# Patient Record
Sex: MALE | Race: WHITE | Employment: FULL TIME | URBAN - METROPOLITAN AREA
[De-identification: names, ages, dates, MRNs, and addresses within clinical notes are randomized per-mention and may not be internally consistent; named-entity substitution may affect disease eponyms.]

---

## 2019-12-06 ENCOUNTER — HOSPITAL ENCOUNTER (OUTPATIENT)
Age: 29
Discharge: HOME | End: 2019-12-08
Payer: COMMERCIAL

## 2019-12-06 ENCOUNTER — HOSPITAL ENCOUNTER (OUTPATIENT)
Dept: CT IMAGING | Age: 29
Discharge: HOME | End: 2019-12-08
Payer: COMMERCIAL

## 2019-12-06 ENCOUNTER — HOSPITAL ENCOUNTER (OUTPATIENT)
Dept: ULTRASOUND IMAGING | Age: 29
Discharge: HOME | End: 2019-12-08
Payer: COMMERCIAL

## 2019-12-06 DIAGNOSIS — R10.11 ABDOMINAL PAIN, RIGHT UPPER QUADRANT: ICD-10-CM

## 2019-12-06 PROCEDURE — 2580000003 HC RX 258: Performed by: RADIOLOGY

## 2019-12-06 PROCEDURE — 6360000004 HC RX CONTRAST MEDICATION: Performed by: RADIOLOGY

## 2019-12-06 PROCEDURE — 76700 US EXAM ABDOM COMPLETE: CPT

## 2019-12-06 PROCEDURE — 74177 CT ABD & PELVIS W/CONTRAST: CPT

## 2019-12-06 RX ORDER — SODIUM CHLORIDE 0.9 % (FLUSH) 0.9 %
10 SYRINGE (ML) INJECTION 2 TIMES DAILY
Status: DISCONTINUED | OUTPATIENT
Start: 2019-12-06 | End: 2019-12-09 | Stop reason: HOSPADM

## 2019-12-06 RX ADMIN — Medication 10 ML: at 15:35

## 2019-12-06 RX ADMIN — IOPAMIDOL 100 ML: 755 INJECTION, SOLUTION INTRAVENOUS at 15:35

## 2019-12-06 RX ADMIN — IOHEXOL 50 ML: 240 INJECTION, SOLUTION INTRATHECAL; INTRAVASCULAR; INTRAVENOUS; ORAL at 15:34

## 2020-09-17 ENCOUNTER — OFFICE VISIT (OUTPATIENT)
Dept: PRIMARY CARE CLINIC | Age: 30
End: 2020-09-17
Payer: COMMERCIAL

## 2020-09-17 ENCOUNTER — HOSPITAL ENCOUNTER (OUTPATIENT)
Age: 30
Discharge: HOME | End: 2020-09-19
Payer: COMMERCIAL

## 2020-09-17 VITALS
HEART RATE: 75 BPM | RESPIRATION RATE: 16 BRPM | TEMPERATURE: 98.1 F | HEIGHT: 68 IN | SYSTOLIC BLOOD PRESSURE: 124 MMHG | WEIGHT: 180 LBS | OXYGEN SATURATION: 99 % | DIASTOLIC BLOOD PRESSURE: 78 MMHG | BODY MASS INDEX: 27.28 KG/M2

## 2020-09-17 PROCEDURE — U0003 INFECTIOUS AGENT DETECTION BY NUCLEIC ACID (DNA OR RNA); SEVERE ACUTE RESPIRATORY SYNDROME CORONAVIRUS 2 (SARS-COV-2) (CORONAVIRUS DISEASE [COVID-19]), AMPLIFIED PROBE TECHNIQUE, MAKING USE OF HIGH THROUGHPUT TECHNOLOGIES AS DESCRIBED BY CMS-2020-01-R: HCPCS

## 2020-09-17 PROCEDURE — 99213 OFFICE O/P EST LOW 20 MIN: CPT | Performed by: PHYSICIAN ASSISTANT

## 2020-09-17 RX ORDER — ONDANSETRON 4 MG/1
4 TABLET, FILM COATED ORAL EVERY 8 HOURS PRN
Qty: 15 TABLET | Refills: 0 | Status: SHIPPED
Start: 2020-09-17 | End: 2020-12-13 | Stop reason: ALTCHOICE

## 2020-09-17 RX ORDER — AZITHROMYCIN 250 MG/1
TABLET, FILM COATED ORAL
Qty: 6 TABLET | Refills: 0 | Status: SHIPPED
Start: 2020-09-17 | End: 2020-12-02

## 2020-09-17 NOTE — PROGRESS NOTES
Chief Complaint   Congestion and Cough      History of Present Illness   Source of history provided by: patient. Kimberly Thompson is a 26 y.o. old male who has a past medical history of: History reviewed. No pertinent past medical history. Presents to the flu clinic for evaluation of nasal congestion, body aches, sore throat, and occasionally productive cough with white sputum x 7 days. Pt reports vomiting episodes that have resolved today. Reports ongoing nausea and loss of taste/smell. States symptoms have been persistent since onset. Denies any fever, chills, CP, dyspnea, LE edema, abdominal pain, rash, or lethargy. Denies history of international travel in the past 14 days. Reports contact with individuals with known COVID-19 infection or under investigation for COVID-19 infection as patient works as a nurse. Denies any hx of asthma or tobacco use. ROS   Pertinent positives and negatives are stated within HPI, all other systems reviewed and are negative. Surgical History:  has no past surgical history on file. Social History:  reports that he has never smoked. He has never used smokeless tobacco.  Family History: family history is not on file. Allergies: Patient has no known allergies. Physical Exam      VS:  /78   Pulse 75   Temp 98.1 °F (36.7 °C)   Resp 16   Ht 5' 8\" (1.727 m)   Wt 180 lb (81.6 kg)   SpO2 99%   BMI 27.37 kg/m²    Oxygen Saturation Interpretation: Normal.    Constitutional:  Alert, development consistent with age. NAD. Head:  NC/NT. Airway patent. Mild TTP over the bilateral maxillary sinuses. Ears: TMs dull bilaterally. Canals without exudate or swelling bilaterally. Mouth: Posterior pharynx with mild erythema and clear postnasal drip. No tonsillar hypertrophy or exudate. Neck:  Normal ROM. Supple. No anterior cervical adenopathy noted. Lungs: CTAB without wheezes, rales, or rhonchi.    CV:  Regular rate and rhythm, normal heart sounds, without pathological murmurs, ectopy, gallops, or rubs. GI: BS+x4. Nontender, nondistended without guarding, rebound, or rigidity. Skin:  Normal turgor. Warm, dry, without visible rash. Lymphatic: No lymphangitis or adenopathy noted. Neurological:  Oriented. Motor functions intact. Lab / Imaging Results   (All laboratory and radiology results have been personally reviewed by myself)  Labs:  No results found for this visit on 09/17/20. Imaging: All Radiology results interpreted by Radiologist unless otherwise noted. No results found. Medical Decision Making   Pt non-toxic, in no apparent distress and stable at time of discharge. Assessment/Plan   Kevin Ho was seen today for congestion and cough. Diagnoses and all orders for this visit:    Suspected COVID-19 virus infection  -     COVID-19 Ambulatory; Future  -     azithromycin (ZITHROMAX Z-MARIELY) 250 MG tablet; Take 2 tabs on day one, then 1 tab daily for the next 4 days  -     ondansetron (ZOFRAN) 4 MG tablet; Take 1 tablet by mouth every 8 hours as needed for Nausea or Vomiting        COVID-19 swab obtained and pending, will call with results once available. Advised strict self-quarantine at home in the interim. Pt should remain out of work for at least 10 days from the start of symptoms. Pt should also be fever free for 24 hours and symptoms should be improved overall prior to returning to work. Work excuse provided to patient today. Scripts written for Zithromax and prn Zofran, side effects discussed. Increase fluids and rest. Additional symptomatic relief discussed including Tylenol prn pain/fever. Schedule virtual f/u with PCP in 7-10 days if symptoms persist. ED sooner if symptoms worsen or change. ED immediately with high or refractory fever, progressive SOB, dyspnea, CP, calf pain/swelling, shaking chills, vomiting, abdominal pain, lethargy, flank pain, or decreased urinary output.  Pt verbalizes understanding and is in agreement with plan of care. All questions answered. Matheus Thompson PA-C    This visit was provided as a focused evaluation during the COVID -19 pandemic/national emergency. A comprehensive review of all previous patient history and testing was not conducted. Pertinent findings were elicited during the visit.

## 2020-09-19 LAB
SARS-COV-2: NOT DETECTED
SOURCE: 1

## 2020-12-02 ENCOUNTER — OFFICE VISIT (OUTPATIENT)
Dept: PRIMARY CARE CLINIC | Age: 30
End: 2020-12-02
Payer: COMMERCIAL

## 2020-12-02 VITALS
TEMPERATURE: 100.5 F | SYSTOLIC BLOOD PRESSURE: 122 MMHG | HEART RATE: 80 BPM | RESPIRATION RATE: 18 BRPM | OXYGEN SATURATION: 96 % | WEIGHT: 180 LBS | BODY MASS INDEX: 27.28 KG/M2 | DIASTOLIC BLOOD PRESSURE: 76 MMHG | HEIGHT: 68 IN

## 2020-12-02 LAB
Lab: NORMAL
QC PASS/FAIL: NORMAL
SARS-COV-2, POC: DETECTED

## 2020-12-02 PROCEDURE — 99213 OFFICE O/P EST LOW 20 MIN: CPT | Performed by: NURSE PRACTITIONER

## 2020-12-02 PROCEDURE — 87426 SARSCOV CORONAVIRUS AG IA: CPT | Performed by: NURSE PRACTITIONER

## 2020-12-02 NOTE — PROGRESS NOTES
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 Darshana Dunham is a 31 y.o. old male who presents to the flu clinic with complaints of Fever, Pharyngitis, Nasal Congestion, Anosmia/Ageusia and Fatigue x 1 days. States symptoms have stayed the same since onset. Has been taking none without symptomatic relief. Denies any Shortness of breath. Denies any hx of no history of pneumonia or bronchitis. ROS   Pertinent positives and negatives are stated within HPI, all other systems reviewed and are negative. Past Medical History:  has no past medical history on file. Past Surgical History:  has no past surgical history on file. Social History:  reports that he has never smoked. He has never used smokeless tobacco.  Family History: family history is not on file. Allergies: Patient has no known allergies. Physical Exam   Vital Signs:  /76   Pulse 80   Temp 100.5 °F (38.1 °C) (Oral)   Resp 18   Ht 5' 8\" (1.727 m)   Wt 180 lb (81.6 kg)   SpO2 96%   BMI 27.37 kg/m²    Oxygen Saturation Interpretation: Normal.    Constitutional:  Alert, development consistent with age. NAD. Head:  NC/NT. Airway patent. Ears: TMs Clear bilaterally. Canals without exudate or swelling bilaterally. Mouth: Posterior pharynx with mild erythema and clear postnasal drip. no tonsillar hypertrophy or exudate. Neck:  Normal ROM. Supple. no anterior cervical adenopathy noted. Lungs: CTAB without wheezes, rales, or rhonchi. CV:  Regular rate and rhythm, normal heart sounds, without pathological murmurs, ectopy, gallops, or rubs. Skin:  Normal turgor. Warm, dry, without visible rash. Lymphatic: No lymphangitis or adenopathy noted. Neurological:  Oriented. Motor functions intact.     Lab / Imaging Results   (All laboratory and radiology results have been personally reviewed by myself)  Labs:  No results found for this visit on 12/02/20. Imaging: All Radiology results interpreted by Radiologist unless otherwise noted. No results found. Medical Decision Making   Pt non-toxic, in no apparent distress and stable at time of discharge. Assessment/Plan   Gaby Day was seen today for nasal congestion, nausea, fever, covid testing, drainage and pharyngitis. Diagnoses and all orders for this visit:    Encounter for laboratory testing for COVID-19 virus  -     POCT COVID-19, Antigen    Lab test positive for detection of COVID-19 virus    Cough        COVID-19 swab obtained and pending, will call with results once available. Advised self-quarantine at home in the interim. Work excuse provided to patient today. Increase fluids and rest. Symptomatic relief discussed including Tylenol prn pain/fever. Schedule f/u with PCP in 7-10 days if symptoms persist. ED sooner if symptoms worsen or change. ED immediately with high or refractory fever, progressive SOB, dyspnea, CP, calf pain/swelling, shaking chills, vomiting, abdominal pain, lethargy, flank pain, or decreased urinary output. Pt verbalizes understanding and is in agreement with plan of care. All questions answered. Nerissa Marks, APRN - CNP    This visit was provided as a focused evaluation during the COVID -19 pandemic/national emergency. A comprehensive review of all previous patient history and testing was not conducted. Pertinent findings were elicited during the visit. *NOTE: This report was transcribed using voice recognition software. Every effort was made to ensure accuracy; however, inadvertent computerized transcription errors may be present.

## 2020-12-02 NOTE — PATIENT INSTRUCTIONS
Patient Education        Learning About Coronavirus [phone])  Coronavirus [phone]): Overview  What is coronavirus ([id number])? The coronavirus disease (COVID-19) is caused by a virus. It is an illness that was first found in December 2019. It has since spread worldwide. The virus can cause fever, cough, and trouble breathing. In severe cases, it can cause pneumonia and make it hard to breathe without help. It can cause death. This virus spreads person-to-person through droplets from coughing and sneezing. It can also spread when you are close to someone who is infected. And it can spread when you touch something that has the virus on it, such as a doorknob or a tabletop. Coronaviruses are a large group of viruses. They cause the common cold. They also cause more serious illnesses like Middle East respiratory syndrome (MERS) and severe acute respiratory syndrome (SARS). COVID-19 is caused by a novel coronavirus. That means it's a new type that has not been seen in people before. How is COVID-19 treated? Mild illness can be treated at home, but more serious illness needs to be treated in the hospital. Treatment may include medicines to reduce symptoms, plus breathing support such as oxygen therapy or a ventilator. Other treatments, such as antiviral medicines, may help people who have COVID-19. What can you do to protect yourself from COVID-19? The best way to protect yourself from getting sick is to:  · Avoid areas where there is an outbreak. · Avoid contact with people who may be infected. · Avoid crowds and try to stay at least 6 feet away from other people. · Wash your hands often, especially after you cough or sneeze. Use soap and water, and scrub for at least 20 seconds. If soap and water aren't available, use an alcohol-based hand . · Avoid touching your mouth, nose, and eyes. What can you do to avoid spreading the virus to others?   To help avoid spreading the virus to others:  · [employer] your hands often with soap or alcohol-based hand sanitizers. · Cover your mouth with a tissue when you cough or sneeze. Then throw the tissue in the trash. · Use a disinfectant to clean things that you touch often. These include doorknobs, remote controls, phones, and handles on your refrigerator and microwave. And don't forget countertops, tabletops, bathrooms, and computer keyboards. · Wear a cloth face cover if you have to go to public areas. If you know or suspect that you have COVID-19:  · Stay home. Don't go to school, work, or public areas. And don't use public transportation, ride-shares, or taxis unless you have no choice. · Leave your home only if you need to get medical care or testing. But call the doctor's office first so they know you're coming. And wear a face cover. · Limit contact with people in your home. If possible, stay in a separate bedroom and use a separate bathroom. · Wear a face cover whenever you're around other people. It can help stop the spread of the virus when you cough or sneeze. · Clean and disinfect your home every day. Use household  and disinfectant wipes or sprays. Take special care to clean things that you grab with your hands. · Self-isolate until it's safe to be around others again. ? If you have symptoms, it's safe when you haven't had a fever for 3 days and your symptoms have improved and it's been at least 10 days since your symptoms started. ? If you were exposed to the virus but don't have symptoms, it's safe to be around others 14 days after exposure. ? Talk to your doctor about whether you also need testing, especially if you have a weakened immune system. When to call for help  Call 911 anytime you think you may need emergency care. For example, call if:  · You have severe trouble breathing. (You can't talk at all.)  · You have constant chest pain or pressure. · You are severely dizzy or lightheaded.   · You are confused or can't think clearly. · Your face and lips have a blue color. · You passed out (lost consciousness) or are very hard to wake up. Call your doctor now if you develop symptoms such as:  · Shortness of breath. · Fever. · Cough. If you need to get care, call ahead to the doctor's office for instructions before you go. Make sure you wear a face cover to prevent exposing other people to the virus. Where can you get the latest information? The following health organizations are tracking and studying this virus. Their websites contain the most up-to-date information. Aleksandra Gayle also learn what to do if you think you may have been exposed to the virus. · U.S. Centers for Disease Control and Prevention (CDC): The CDC provides updated news about the disease and travel advice. The website also tells you how to prevent the spread of infection. www.cdc.gov  · World Health Organization [facility]): WHO offers information about the virus outbreaks. WHO also has travel advice. www.who.int  Current as of: July 10, 2020               Content Version: 12.6  © 2006-2020 [employer]. Care instructions adapted under license by [facility] ([facility]). If you have questions about a medical condition or this instruction, always ask your healthcare professional. [place] [address] any warranty or liability for your use of this information. Patient Education        Coronavirus ([id number]): Care Instructions  Overview  The coronavirus disease (COVID-19) is caused by a virus. Symptoms may include a fever, a cough, and shortness of breath. It mainly spreads person-to-person through droplets from coughing and sneezing. The virus also can spread when people are in close contact with someone who is infected. Most people have mild symptoms and can take care of themselves at home.  If their symptoms get worse, they may need care in a hospital. Treatment may include medicines to reduce symptoms, plus breathing support such as oxygen therapy or a ventilator. It's important to not spread the virus to others. If you have COVID-19, wear a face cover anytime you are around other people. You need to isolate yourself while you are sick. Leave your home only if you need to get medical care or testing. Follow-up care is a key part of your treatment and safety. Be sure to make and go to all appointments, and call your doctor if you are having problems. It's also a good idea to know your test results and keep a list of the medicines you take. How can you care for yourself at home? · Get extra rest. It can help you feel better. · Drink plenty of fluids. This helps replace fluids lost from fever. Fluids also help ease a scratchy throat. Water, soup, fruit juice, and hot tea with lemon are good choices. · Take acetaminophen (such as Tylenol) to reduce a fever. It may also help with muscle aches. Read and follow all instructions on the label. · Use petroleum jelly on sore skin. This can help if the skin around your nose and lips becomes sore from rubbing a lot with tissues. Tips for self-isolation  · Limit contact with people in your home. If possible, stay in a separate bedroom and use a separate bathroom. · Wear a cloth face cover when you are around other people. It can help stop the spread of the virus when you cough or sneeze. · If you have to leave home, avoid crowds and try to stay at least 6 feet away from other people. · Avoid contact with pets and other animals. · Cover your mouth and nose with a tissue when you cough or sneeze. Then throw it in the trash right away. · Wash your hands often, especially after you cough or sneeze. Use soap and water, and scrub for at least 20 seconds. If soap and water aren't available, use an alcohol-based hand . · Don't share personal household items. These include bedding, towels, cups and glasses, and eating utensils.   · [employer] laundry in the warmest water allowed for the fabric type, and dry it completely. It's okay to wash other people's laundry with yours. · Clean and disinfect your home every day. Use household  and disinfectant wipes or sprays. Take special care to clean things that you grab with your hands. These include doorknobs, remote controls, phones, and handles on your refrigerator and microwave. And don't forget countertops, tabletops, bathrooms, and computer keyboards. When you can end self-isolation  · If you know or suspect that you have COVID-19, stay in self-isolation until:  ? You haven't had a fever for 3 days, and  ? Your symptoms have improved, and  ? It's been at least 10 days since your symptoms started. · Talk to your doctor about whether you also need testing, especially if you have a weakened immune system. When should you call for help? Call 911 anytime you think you may need emergency care. For example, call if you have life-threatening symptoms, such as:    · You have severe trouble breathing. (You can't talk at all.)     · You have constant chest pain or pressure.     · You are severely dizzy or lightheaded.     · You are confused or can't think clearly.     · Your face and lips have a blue color.     · You pass out (lose consciousness) or are very hard to wake up. Call your doctor now or seek immediate medical care if:    · You have moderate trouble breathing. (You can't speak a full sentence.)     · You are coughing up blood (more than about 1 teaspoon).     · You have signs of low blood pressure. These include feeling lightheaded; being too weak to stand; and having cold, pale, clammy skin. Watch closely for changes in your health, and be sure to contact your doctor if:    · Your symptoms get worse.     · You are not getting better as expected. Call before you go to the doctor's office. Follow their instructions. And wear a cloth face cover. Current as of: July 10, 2020               Content Version: 12.6  © 9972-1518 [employer]. Care instructions adapted under license by [facility] ([facility]). If you have questions about a medical condition or this instruction, always ask your healthcare professional. [address] any warranty or liability for your use of this information. Patient Education        Learning About COVID-19 and Social Distancing  What is it? Social distancing means putting space between yourself and other people. The recommended distance is 6 feet, or about 2 meters. This also means staying away from any place where people may gather, such as [place] or other public gathering places. Why is it important? Social distancing is the best way to reduce the spread of COVID-19. This virus seems to spread from person to person through droplets from coughing and sneezing. So if you keep your distance from others, you're less likely to get it or spread it. And social distancing is important for everyone, not just those who are at high risk of infection, like older people. You might have the virus but not have symptoms. You could then give the infection to someone you come into contact with. How is it done? Putting 6 feet, or about 2 meters, between you and other people is the recommended distance. Also stay away from any place where people may gather, such as [place] or other public gathering places. So if possible:  · Work from home, and keep your kids at home. · Don't travel if you don't have to. And avoid public transportation, ride-shares, and taxis unless you have no choice. · Limit shopping to essentials, like food and medicines. · Wear a cloth face cover if you have to go to a public place like the grocery store or pharmacy. · Don't eat in restaurants. (You can still get takeout or food deliveries.)  · Avoid crowds and busy places. Follow stay-at-home orders or other directions for your area. Current as of: July 10, 2020               Content Version: 12.6  © 1464-0711 [facility], [employer].    Care instructions adapted under license by [facility] ([facility]). If you have questions about a medical condition or this instruction, always ask your healthcare professional. [address] any warranty or liability for your use of this information. Patient Education        Cough: Care Instructions  Your Care Instructions     A cough is your body's response to something that bothers your throat or airways. Many things can cause a cough. You might cough because of a cold or the flu, bronchitis, or asthma. Smoking, postnasal drip, allergies, and stomach acid that backs up into your throat also can cause coughs. A cough is a symptom, not a disease. Most coughs stop when the cause, such as a cold, goes away. You can take a few steps at home to cough less and feel better. Follow-up care is a key part of your treatment and safety. Be sure to make and go to all appointments, and call your doctor if you are having problems. It's also a good idea to know your test results and keep a list of the medicines you take. How can you care for yourself at home? · Drink lots of water and other fluids. This helps thin the mucus and soothes a dry or sore throat. Honey or lemon juice in hot water or tea may ease a dry cough. · Take cough medicine as directed by your doctor. · Prop up your head on pillows to help you breathe and ease a dry cough. · Try cough drops to soothe a dry or sore throat. Cough drops don't stop a cough. Medicine-flavored cough drops are no better than candy-flavored drops or hard candy. · Do not smoke. Avoid secondhand smoke. If you need help quitting, talk to your doctor about stop-smoking programs and medicines. These can increase your chances of quitting for good. When should you call for help? Call 911 anytime you think you may need emergency care. For example, call if:    · You have severe trouble breathing.    Call your doctor now or seek immediate medical care if:    · You cough up blood.     · You have new or worse trouble breathing.     · You have a new or higher fever.     · You have a new rash. Watch closely for changes in your health, and be sure to contact your doctor if:    · You cough more deeply or more often, especially if you notice more mucus or a change in the color of your mucus.     · You have new symptoms, such as a sore throat, an earache, or sinus pain.     · You do not get better as expected. Where can you learn more? Go to https://Vinny.[employer]. org and sign in to your My [employer] account. Enter D279 in the oboxo box to learn more about \"Cough: Care Instructions. \"     If you do not have an account, please click on the \"Sign Up Now\" link. Current as of: February 24, 2020               Content Version: 12.6  © 6684-6454 [employer]. Care instructions adapted under license by [facility] ([facility]). If you have questions about a medical condition or this instruction, always ask your healthcare professional. [address] any warranty or liability for your use of this information.

## 2020-12-02 NOTE — LETTER
NOTIFICATION RETURN TO WORK / SCHOOL    12/2/2020    Mr. Damián Shipley  [address] [place] [address]      To Whom It May Concern:    Damián Shipley was tested for COVID-19 on 12/2, and the result was Positive. He may return to work on 12/12. I recommend:return without restrictions    If there are questions or concerns, please have the patient contact our office.         Sincerely,          RADHA Preston - CNP

## 2020-12-03 ENCOUNTER — TELEPHONE (OUTPATIENT)
Dept: PRIMARY CARE CLINIC | Age: 30
End: 2020-12-03

## 2020-12-03 ENCOUNTER — HOSPITAL ENCOUNTER (EMERGENCY)
Age: 30
Discharge: HOME | End: 2020-12-03
Attending: EMERGENCY MEDICINE
Payer: COMMERCIAL

## 2020-12-03 ENCOUNTER — APPOINTMENT (OUTPATIENT)
Dept: GENERAL RADIOLOGY | Age: 30
End: 2020-12-03
Payer: COMMERCIAL

## 2020-12-03 VITALS
TEMPERATURE: 97.3 F | DIASTOLIC BLOOD PRESSURE: 61 MMHG | OXYGEN SATURATION: 96 % | HEART RATE: 86 BPM | SYSTOLIC BLOOD PRESSURE: 123 MMHG | WEIGHT: 180 LBS | RESPIRATION RATE: 18 BRPM | BODY MASS INDEX: 27.28 KG/M2 | HEIGHT: 68 IN

## 2020-12-03 PROCEDURE — 71046 X-RAY EXAM CHEST 2 VIEWS: CPT

## 2020-12-03 PROCEDURE — 99282 EMERGENCY DEPT VISIT SF MDM: CPT

## 2020-12-03 PROCEDURE — 6360000002 HC RX W HCPCS: Performed by: PHYSICIAN ASSISTANT

## 2020-12-03 PROCEDURE — 96372 THER/PROPH/DIAG INJ SC/IM: CPT

## 2020-12-03 RX ORDER — DEXAMETHASONE SODIUM PHOSPHATE 10 MG/ML
10 INJECTION, SOLUTION INTRAMUSCULAR; INTRAVENOUS ONCE
Status: COMPLETED | OUTPATIENT
Start: 2020-12-03 | End: 2020-12-03

## 2020-12-03 RX ORDER — BENZONATATE 100 MG/1
100 CAPSULE ORAL 3 TIMES DAILY PRN
Qty: 21 CAPSULE | Refills: 0 | Status: SHIPPED | OUTPATIENT
Start: 2020-12-03 | End: 2020-12-10

## 2020-12-03 RX ORDER — PREDNISONE 10 MG/1
40 TABLET ORAL DAILY
Qty: 20 TABLET | Refills: 0 | Status: SHIPPED | OUTPATIENT
Start: 2020-12-03 | End: 2020-12-08

## 2020-12-03 RX ORDER — AZITHROMYCIN 250 MG/1
TABLET, FILM COATED ORAL
Qty: 1 PACKET | Refills: 0 | Status: SHIPPED | OUTPATIENT
Start: 2020-12-03 | End: 2020-12-07

## 2020-12-03 RX ORDER — AZITHROMYCIN 250 MG/1
250 TABLET, FILM COATED ORAL SEE ADMIN INSTRUCTIONS
Qty: 6 TABLET | Refills: 0 | Status: SHIPPED
Start: 2020-12-03 | End: 2020-12-03

## 2020-12-03 RX ORDER — ALBUTEROL SULFATE 90 UG/1
2 AEROSOL, METERED RESPIRATORY (INHALATION) EVERY 4 HOURS PRN
Qty: 1 INHALER | Refills: 0 | Status: SHIPPED | OUTPATIENT
Start: 2020-12-03 | End: 2020-12-13 | Stop reason: ALTCHOICE

## 2020-12-03 RX ADMIN — DEXAMETHASONE SODIUM PHOSPHATE 10 MG: 10 INJECTION, SOLUTION INTRAMUSCULAR; INTRAVENOUS at 20:45

## 2020-12-03 NOTE — TELEPHONE ENCOUNTER
Pt stopped in the clinic today. States he thought he was suppose to get a zpak yesterday when seeing you at the clinic. Please advise.

## 2020-12-03 NOTE — ED NOTES
FIRST PROVIDER CONTACT ASSESSMENT NOTE      Department of Emergency Medicine   ED  First Provider Note   12/3/20  6:54 PM EST    Chief Complaint: Positive For Covid-19; Fever; and Shortness of Breath (83% RA at home)      History of Present Illness:    Milton Gorman is a 31 y.o. male who presents to the ED by private car for patient comes in with complaint of fever shortness of breath. He checked his oximeter at home states it was 83%  Focused Screening Exam:  Constitutional:  Alert, appears stated age and is in no distress. Heart regular rate and rhythm  Lungs clear    *ALLERGIES*     Patient has no known allergies.      ED Triage Vitals   BP Temp Temp src Pulse Resp SpO2 Height Weight   12/03/20 1852 12/03/20 1851 -- 12/03/20 1851 12/03/20 1851 12/03/20 1851 12/03/20 1851 12/03/20 1851   123/61 97.3 °F (36.3 °C)  86 18 96 % 5' 8\" (1.727 m) 180 lb (81.6 kg)        Initial Plan of Care:  Initiate Treatment-Testing, Proceed toTreatment Area When Bed Available for ED Attending/MLP to Continue Care    -----------------[address] ASSESSMENT NOTE--------------  Electronically signed by CHRISTIAN Hines   DD: 12/3/20     CHRISTIAN Hines  12/1990

## 2020-12-04 ENCOUNTER — CARE COORDINATION (OUTPATIENT)
Dept: OTHER | Facility: CLINIC | Age: 30
End: 2020-12-04

## 2020-12-04 NOTE — ED PROVIDER NOTES
Normal      ---------------------------------------------------PHYSICAL EXAM--------------------------------------      Constitutional/General: Alert and oriented x3, well appearing, non toxic in NAD  Head: Normocephalic and atraumatic  Eyes: PERRL, EOMI  Mouth: Oropharynx clear, handling secretions, no trismus  Neck: Supple, full ROM,   Pulmonary: Lungs clear to auscultation bilaterally, no wheezes, rales, or rhonchi. Not in respiratory distress  Cardiovascular:  Regular rate and rhythm, no murmurs, gallops, or rubs. 2+ distal pulses  Abdomen: Soft, non tender, non distended,   Extremities: Moves all extremities x 4. Warm and well perfused no edema  Skin: warm and dry without rash  Neurologic: GCS 15,  Psych: Normal Affect      ------------------------------ ED COURSE/MEDICAL DECISION MAKING----------------------  Medications   dexamethasone (PF) (DECADRON) injection 10 mg (10 mg Intramuscular Given 12/3/20 2045)         ED COURSE:     PERC Rule for PE:      Age ? 50 negative     HR ? 100 negative     O2 Sat on Room Air < 95% negative     Prior History of DVT/PE negative     Recent Trauma or Surgery negative     Hemoptysis negative     Exogenous Estrogen/Hormone Use negative     Unilateral Extgremity Swelling negative     * If ANY Criteria are positive, the PERC rule is not satisfied and cannot be used to rule out PE in this patient. Medical Decision Making:    Patient recently diagnosed with COVID-19. His girlfriend noted a low pulse ox while he was sleeping. Patient is oxygenating well at rest and with ambulation here with pulse ox 96% with rest and ambulation. Discussed with patient we are unsure if patient is having some episodes of sleep apnea will need to follow-up outpatient for sleep study. Possibly pulse ox not correlating well.   Chest x-ray was obtained there is no infiltrate or pneumonia present will be treated symptomatically was given a dose of Decadron here will be discharged with prednisone burst [employer] albuterol inhaler Z-Bradley. He is to return to the ER if any worsening symptoms we discussed monitoring his O2 saturations if they drop less than 90% he is to return to the ER. He is to maintain self quarantine    Counseling: The emergency provider has spoken with the patient and discussed todays results, in addition to providing specific details for the plan of care and counseling regarding the diagnosis and prognosis. Questions are answered at this time and they are agreeable with the plan.      --------------------------------- IMPRESSION AND DISPOSITION ---------------------------------    IMPRESSION  1. COVID-19 virus infection    2. Dyspnea and respiratory abnormalities        DISPOSITION  Disposition: Discharge to home  Patient condition is good      NOTE: This report was transcribed using voice recognition software.  Every effort was made to ensure accuracy; however, inadvertent computerized transcription errors may be present     [place], [place]  12/03/20 6053

## 2020-12-04 NOTE — CARE COORDINATION
[facility] ED Follow Up Call    2020    Patient: Brea Draper Patient : 1990   MRN: [id number]  Reason for Admission: + COVID-19, SOB, low O2 sats  Discharge Date: 2020    Date/Time:  2020 1:55 PM  Attempted to reach patient by telephone. Call within 2 business days of discharge: Yes Left HIPPA compliant message requesting a return call. Will attempt to reach patient again. ACM attempted to reach patient on the only phone number listed in patient's records, this number rings and goes to a VM that identifies patient as \"Jai\" and then goes to a recording that states that the mailbox is full and a message cannot be left. AC will send an UTR letter and COVID-19 resource letter to patient via [employer] at this time and set another outreach attempt for 2020. DEVIKA Troncoso, RN  Associate Care Manager   Cell: [phone]  [email or web address]

## 2020-12-07 ENCOUNTER — CARE COORDINATION (OUTPATIENT)
Dept: OTHER | Facility: CLINIC | Age: 30
End: 2020-12-07

## 2020-12-13 ENCOUNTER — OFFICE VISIT (OUTPATIENT)
Dept: PRIMARY CARE CLINIC | Age: 30
End: 2020-12-13
Payer: COMMERCIAL

## 2020-12-13 VITALS
WEIGHT: 180 LBS | DIASTOLIC BLOOD PRESSURE: 78 MMHG | TEMPERATURE: 96.5 F | OXYGEN SATURATION: 95 % | HEART RATE: 85 BPM | BODY MASS INDEX: 27.28 KG/M2 | HEIGHT: 68 IN | SYSTOLIC BLOOD PRESSURE: 112 MMHG

## 2020-12-13 PROCEDURE — 99213 OFFICE O/P EST LOW 20 MIN: CPT | Performed by: FAMILY MEDICINE

## 2020-12-13 RX ORDER — METHYLPREDNISOLONE 4 MG/1
TABLET ORAL
Qty: 1 KIT | Refills: 0 | Status: ON HOLD
Start: 2020-12-13 | End: 2021-01-03 | Stop reason: ALTCHOICE

## 2020-12-13 RX ORDER — CEFDINIR 300 MG/1
300 CAPSULE ORAL 2 TIMES DAILY
Qty: 20 CAPSULE | Refills: 0 | Status: SHIPPED | OUTPATIENT
Start: 2020-12-13 | End: 2020-12-23

## 2020-12-13 ASSESSMENT — ENCOUNTER SYMPTOMS
EYES NEGATIVE: 1
SINUS PAIN: 1
COUGH: 1
ALLERGIC/IMMUNOLOGIC NEGATIVE: 1
GASTROINTESTINAL NEGATIVE: 1

## 2020-12-13 NOTE — PROGRESS NOTES
20  Name: Milton Gorman    : 1990    Sex: male    Age: 31 y.o. Subjective:  Chief Complaint: Patient is here for sinus   Drainage  Mucus   cough       Pos  Test at [facility]  On   Since with  Sl cough  Mucus  Sinus  No  Chills   Sl  Temp few days ago    Non smoker  Work at  [employer]  He   Was blanco schwartz joe pt  Hs ot used      Review of Systems   Constitutional: Negative. HENT: Positive for sinus pain. Eyes: Negative. Respiratory: Positive for cough. Cardiovascular: Negative. Gastrointestinal: Negative. Endocrine: Negative. Genitourinary: Negative. Musculoskeletal: Negative. Skin: Negative. Allergic/Immunologic: Negative. Neurological: Negative. Hematological: Negative. Psychiatric/Behavioral: Negative.           Current Outpatient Medications:     cefdinir (OMNICEF) 300 MG capsule, Take 1 capsule by mouth 2 times daily for 10 days, Disp: 20 capsule, Rfl: 0    methylPREDNISolone (MEDROL DOSEPACK) 4 MG tablet, Take by mouth., Disp: 1 kit, Rfl: 0  No Known Allergies  Social History     Socioeconomic History    Marital status: Single     Spouse name: Not on file    Number of children: Not on file    Years of education: Not on file    Highest education level: Not on file   Occupational History    Not on file   Social Needs    Financial resource strain: Not on file    Food insecurity     Worry: Not on file     Inability: Not on file    Transportation needs     Medical: Not on file     Non-medical: Not on file   Tobacco Use    Smoking status: Never Smoker    Smokeless tobacco: Never Used   Substance and Sexual Activity    Alcohol use: Not on file    Drug use: Not on file    Sexual activity: Not on file   Lifestyle    Physical activity     Days per week: Not on file     Minutes per session: Not on file    Stress: Not on file   Relationships    Social connections     Talks on phone: Not on file Gets together: Not on file     Attends Alevism service: Not on file     Active member of club or organization: Not on file     Attends meetings of clubs or organizations: Not on file     Relationship status: Not on file    Intimate partner violence     Fear of current or ex partner: Not on file     Emotionally abused: Not on file     Physically abused: Not on file     Forced sexual activity: Not on file   Other Topics Concern    Not on file   Social History Narrative    Not on file      No past medical history on file. No family history on file. No past surgical history on file. Vitals:    12/13/20 1020   BP: 112/78   Pulse: 85   Temp: 96.5 °F (35.8 °C)   SpO2: 95%   Weight: 180 lb (81.6 kg)   Height: 5' 8\" (1.727 m)       Objective:    Physical Exam  Vitals signs reviewed. Constitutional:       Appearance: He is well-developed. HENT:      Head: Normocephalic. Eyes:      Pupils: Pupils are equal, round, and reactive to light. Neck:      Musculoskeletal: Normal range of motion. Cardiovascular:      Rate and Rhythm: Normal rate and regular rhythm. Pulmonary:      Effort: Pulmonary effort is normal.      Breath sounds: Normal breath sounds. Abdominal:      Palpations: Abdomen is soft. Musculoskeletal: Normal range of motion. Skin:     General: Skin is warm. Neurological:      Mental Status: He is alert and oriented to person, place, and time. Psychiatric:         Behavior: Behavior normal.         Gucci Robbins was seen today for fever. Diagnoses and all orders for this visit:    COVID-19  -     cefdinir (OMNICEF) 300 MG capsule; Take 1 capsule by mouth 2 times daily for 10 days  -     methylPREDNISolone (MEDROL DOSEPACK) 4 MG tablet; Take by mouth.         Comments: fu with pcp tomorrow  If  Chills or temp to er     Get oxismeter and  Er if less then  90 %  To radha alb  Qid TONG-Bradley prescribed. Over-the-counter zinc and vitamin C. Purchase an oximeter and call if oxygen saturation less than 90%. If any temperature over 102 degree, shortness of breath,  chest pain go to the emergency room. Complete isolation until results are back from the Covid testing. Do not work until results are back. A great deal of time spent reviewing medications, diet, exercise, social issues. Also reviewing the chart before entering the room with patient and finishing charting after leaving patient's room. More than half of that time was spent face to face with the patient in counseling and coordinating care. Follow Up: Return for F/U PCP tomorrow.      Seen by:  Sydnee Watlon DO

## 2020-12-15 ENCOUNTER — CARE COORDINATION (OUTPATIENT)
Dept: OTHER | Facility: CLINIC | Age: 30
End: 2020-12-15

## 2020-12-15 NOTE — CARE COORDINATION
[facility] ED Follow Up Call    12/15/2020    Patient: Benjamin Zuleta Patient : 1990   MRN: [id number]  Reason for Admission: suspected COVID-19  Discharge Date:        Date/Time:  12/15/2020 11:37 AM  Attempted to reach patient by telephone. Call within 2 business days of discharge: Yes Left HIPPA compliant message requesting a return call. Will attempt to reach patient again. [facility] has made x3 attempts to reach patient for COVID CT CM. ACM sent COVID UTR and COVID resources to patient via [employer] at first unsuccessful outreach as patient's phone number goes to a full VM box. ACM will make further outreach attempt within 3-4 business days. DEVIKA Barnard, RN  Associate Care Manager   Cell: [phone]  [email or web address]

## 2020-12-18 ENCOUNTER — CARE COORDINATION (OUTPATIENT)
Dept: OTHER | Facility: CLINIC | Age: 30
End: 2020-12-18

## 2020-12-18 NOTE — CARE COORDINATION
You Patient resolved from the Care Transitions episode on 12/18/2020  Discussed COVID-19 related testing which was available at this time. Test results were negative. Patient informed of results, if available? Unsure, ACM was unable to outreach patient after several attempts. Patient/family has been provided the following resources and education related to COVID-19:                         Signs, symptoms and red flags related to COVID-19            CDC exposure and quarantine guidelines            [employer] exposure contact - [phone]            Contact for their local Department of Health                 Patient currently reports that the following symptoms have improved:  ACM unable to assess current or past s/s as outreach attempts were unsuccessful. No further outreach scheduled with this CTN/ACM. Episode of Care resolved. Patient has this CTN/ACM contact information if future needs arise. RADHA KuoN, RN  Associate Care Manager   Cell: [phone]  [email or web address]

## 2021-01-03 ENCOUNTER — ANESTHESIA EVENT (OUTPATIENT)
Dept: OPERATING ROOM | Age: 31
DRG: 342 | End: 2021-01-03
Payer: COMMERCIAL

## 2021-01-03 ENCOUNTER — NURSE TRIAGE (OUTPATIENT)
Dept: OTHER | Facility: CLINIC | Age: 31
End: 2021-01-03

## 2021-01-03 ENCOUNTER — ANESTHESIA (OUTPATIENT)
Dept: OPERATING ROOM | Age: 31
DRG: 342 | End: 2021-01-03
Payer: COMMERCIAL

## 2021-01-03 ENCOUNTER — HOSPITAL ENCOUNTER (INPATIENT)
Age: 31
LOS: 1 days | Discharge: HOME | DRG: 342 | End: 2021-01-04
Attending: EMERGENCY MEDICINE | Admitting: SURGERY
Payer: COMMERCIAL

## 2021-01-03 ENCOUNTER — APPOINTMENT (OUTPATIENT)
Dept: CT IMAGING | Age: 31
DRG: 342 | End: 2021-01-03
Payer: COMMERCIAL

## 2021-01-03 VITALS — DIASTOLIC BLOOD PRESSURE: 88 MMHG | SYSTOLIC BLOOD PRESSURE: 141 MMHG | OXYGEN SATURATION: 98 %

## 2021-01-03 DIAGNOSIS — K35.30 ACUTE APPENDICITIS WITH LOCALIZED PERITONITIS, WITHOUT PERFORATION, ABSCESS, OR GANGRENE: Primary | ICD-10-CM

## 2021-01-03 PROBLEM — K35.80 ACUTE APPENDICITIS: Status: ACTIVE | Noted: 2021-01-03

## 2021-01-03 LAB
ALBUMIN SERPL-MCNC: 4.5 G/DL (ref 3.5–5.2)
ALP BLD-CCNC: 58 U/L (ref 40–129)
ALT SERPL-CCNC: 26 U/L (ref 0–40)
ANION GAP SERPL CALCULATED.3IONS-SCNC: 11 MMOL/L (ref 7–16)
AST SERPL-CCNC: 21 U/L (ref 0–39)
ATYPICAL LYMPHOCYTE RELATIVE PERCENT: 0.9 % (ref 0–4)
BASOPHILS ABSOLUTE: 0 E9/L (ref 0–0.2)
BASOPHILS RELATIVE PERCENT: 0.2 % (ref 0–2)
BILIRUB SERPL-MCNC: 0.6 MG/DL (ref 0–1.2)
BILIRUBIN URINE: NEGATIVE
BLOOD, URINE: NEGATIVE
BUN BLDV-MCNC: 10 MG/DL (ref 6–20)
CALCIUM SERPL-MCNC: 8.9 MG/DL (ref 8.6–10.2)
CHLORIDE BLD-SCNC: 99 MMOL/L (ref 98–107)
CLARITY: CLEAR
CO2: 28 MMOL/L (ref 22–29)
COLOR: YELLOW
CREAT SERPL-MCNC: 1 MG/DL (ref 0.7–1.2)
EOSINOPHILS ABSOLUTE: 0 E9/L (ref 0.05–0.5)
EOSINOPHILS RELATIVE PERCENT: 0.5 % (ref 0–6)
GFR AFRICAN AMERICAN: >60
GFR NON-AFRICAN AMERICAN: >60 ML/MIN/1.73
GLUCOSE BLD-MCNC: 103 MG/DL (ref 74–99)
GLUCOSE URINE: NEGATIVE MG/DL
HCT VFR BLD CALC: 40.7 % (ref 37–54)
HEMOGLOBIN: 14.6 G/DL (ref 12.5–16.5)
KETONES, URINE: NEGATIVE MG/DL
LACTIC ACID: 1.1 MMOL/L (ref 0.5–2.2)
LEUKOCYTE ESTERASE, URINE: NEGATIVE
LIPASE: 14 U/L (ref 13–60)
LYMPHOCYTES ABSOLUTE: 0.61 E9/L (ref 1.5–4)
LYMPHOCYTES RELATIVE PERCENT: 10.4 % (ref 20–42)
MCH RBC QN AUTO: 29.3 PG (ref 26–35)
MCHC RBC AUTO-ENTMCNC: 35.9 % (ref 32–34.5)
MCV RBC AUTO: 81.6 FL (ref 80–99.9)
MONOCYTES ABSOLUTE: 0.5 E9/L (ref 0.1–0.95)
MONOCYTES RELATIVE PERCENT: 8.7 % (ref 2–12)
NEUTROPHILS ABSOLUTE: 4.4 E9/L (ref 1.8–7.3)
NEUTROPHILS RELATIVE PERCENT: 80 % (ref 43–80)
NITRITE, URINE: NEGATIVE
OVALOCYTES: ABNORMAL
PDW BLD-RTO: 12.5 FL (ref 11.5–15)
PH UA: 7 (ref 5–9)
PLATELET # BLD: 109 E9/L (ref 130–450)
PMV BLD AUTO: 11 FL (ref 7–12)
POIKILOCYTES: ABNORMAL
POTASSIUM SERPL-SCNC: 3.8 MMOL/L (ref 3.5–5)
PROTEIN UA: NEGATIVE MG/DL
RBC # BLD: 4.99 E12/L (ref 3.8–5.8)
SODIUM BLD-SCNC: 138 MMOL/L (ref 132–146)
SPECIFIC GRAVITY UA: 1.02 (ref 1–1.03)
TOTAL PROTEIN: 6.5 G/DL (ref 6.4–8.3)
UROBILINOGEN, URINE: 0.2 E.U./DL
WBC # BLD: 5.5 E9/L (ref 4.5–11.5)

## 2021-01-03 PROCEDURE — 6360000004 HC RX CONTRAST MEDICATION: Performed by: RADIOLOGY

## 2021-01-03 PROCEDURE — G0378 HOSPITAL OBSERVATION PER HR: HCPCS

## 2021-01-03 PROCEDURE — 96365 THER/PROPH/DIAG IV INF INIT: CPT

## 2021-01-03 PROCEDURE — 7100000000 HC PACU RECOVERY - FIRST 15 MIN: Performed by: SURGERY

## 2021-01-03 PROCEDURE — 85025 COMPLETE CBC W/AUTO DIFF WBC: CPT

## 2021-01-03 PROCEDURE — 2580000003 HC RX 258: Performed by: NURSE ANESTHETIST, CERTIFIED REGISTERED

## 2021-01-03 PROCEDURE — 99284 EMERGENCY DEPT VISIT MOD MDM: CPT

## 2021-01-03 PROCEDURE — 2709999900 HC NON-CHARGEABLE SUPPLY: Performed by: SURGERY

## 2021-01-03 PROCEDURE — 3600000003 HC SURGERY LEVEL 3 BASE: Performed by: SURGERY

## 2021-01-03 PROCEDURE — 96375 TX/PRO/DX INJ NEW DRUG ADDON: CPT

## 2021-01-03 PROCEDURE — 88304 TISSUE EXAM BY PATHOLOGIST: CPT

## 2021-01-03 PROCEDURE — 1200000000 HC SEMI PRIVATE

## 2021-01-03 PROCEDURE — 2500000003 HC RX 250 WO HCPCS: Performed by: EMERGENCY MEDICINE

## 2021-01-03 PROCEDURE — 81003 URINALYSIS AUTO W/O SCOPE: CPT

## 2021-01-03 PROCEDURE — 6360000002 HC RX W HCPCS: Performed by: ANESTHESIOLOGY

## 2021-01-03 PROCEDURE — 83690 ASSAY OF LIPASE: CPT

## 2021-01-03 PROCEDURE — 6360000002 HC RX W HCPCS: Performed by: EMERGENCY MEDICINE

## 2021-01-03 PROCEDURE — 36415 COLL VENOUS BLD VENIPUNCTURE: CPT

## 2021-01-03 PROCEDURE — 2500000003 HC RX 250 WO HCPCS: Performed by: SURGERY

## 2021-01-03 PROCEDURE — 2580000003 HC RX 258: Performed by: STUDENT IN AN ORGANIZED HEALTH CARE EDUCATION/TRAINING PROGRAM

## 2021-01-03 PROCEDURE — 7100000001 HC PACU RECOVERY - ADDTL 15 MIN: Performed by: SURGERY

## 2021-01-03 PROCEDURE — 2580000003 HC RX 258: Performed by: EMERGENCY MEDICINE

## 2021-01-03 PROCEDURE — 3600000013 HC SURGERY LEVEL 3 ADDTL 15MIN: Performed by: SURGERY

## 2021-01-03 PROCEDURE — 3700000000 HC ANESTHESIA ATTENDED CARE: Performed by: SURGERY

## 2021-01-03 PROCEDURE — 6360000002 HC RX W HCPCS: Performed by: NURSE ANESTHETIST, CERTIFIED REGISTERED

## 2021-01-03 PROCEDURE — 3700000001 HC ADD 15 MINUTES (ANESTHESIA): Performed by: SURGERY

## 2021-01-03 PROCEDURE — 80053 COMPREHEN METABOLIC PANEL: CPT

## 2021-01-03 PROCEDURE — 96367 TX/PROPH/DG ADDL SEQ IV INF: CPT

## 2021-01-03 PROCEDURE — 2720000010 HC SURG SUPPLY STERILE: Performed by: SURGERY

## 2021-01-03 PROCEDURE — 0DTJ4ZZ RESECTION OF APPENDIX, PERCUTANEOUS ENDOSCOPIC APPROACH: ICD-10-PCS | Performed by: SURGERY

## 2021-01-03 PROCEDURE — 2500000003 HC RX 250 WO HCPCS: Performed by: NURSE ANESTHETIST, CERTIFIED REGISTERED

## 2021-01-03 PROCEDURE — 74177 CT ABD & PELVIS W/CONTRAST: CPT

## 2021-01-03 PROCEDURE — 83605 ASSAY OF LACTIC ACID: CPT

## 2021-01-03 RX ORDER — OXYCODONE HYDROCHLORIDE AND ACETAMINOPHEN 5; 325 MG/1; MG/1
2 TABLET ORAL PRN
Status: ACTIVE | OUTPATIENT
Start: 2021-01-03 | End: 2021-01-03

## 2021-01-03 RX ORDER — ONDANSETRON 2 MG/ML
4 INJECTION INTRAMUSCULAR; INTRAVENOUS EVERY 6 HOURS PRN
Status: DISCONTINUED | OUTPATIENT
Start: 2021-01-03 | End: 2021-01-04 | Stop reason: HOSPADM

## 2021-01-03 RX ORDER — MIDAZOLAM HYDROCHLORIDE 1 MG/ML
INJECTION INTRAMUSCULAR; INTRAVENOUS PRN
Status: DISCONTINUED | OUTPATIENT
Start: 2021-01-03 | End: 2021-01-03 | Stop reason: SDUPTHER

## 2021-01-03 RX ORDER — ONDANSETRON 4 MG/1
4 TABLET, ORALLY DISINTEGRATING ORAL EVERY 8 HOURS PRN
Status: DISCONTINUED | OUTPATIENT
Start: 2021-01-03 | End: 2021-01-04

## 2021-01-03 RX ORDER — ONDANSETRON 2 MG/ML
4 INJECTION INTRAMUSCULAR; INTRAVENOUS
Status: COMPLETED | OUTPATIENT
Start: 2021-01-03 | End: 2021-01-03

## 2021-01-03 RX ORDER — LIDOCAINE HYDROCHLORIDE 20 MG/ML
INJECTION, SOLUTION INTRAVENOUS PRN
Status: DISCONTINUED | OUTPATIENT
Start: 2021-01-03 | End: 2021-01-03 | Stop reason: SDUPTHER

## 2021-01-03 RX ORDER — SODIUM CHLORIDE 9 MG/ML
INJECTION, SOLUTION INTRAVENOUS CONTINUOUS PRN
Status: DISCONTINUED | OUTPATIENT
Start: 2021-01-03 | End: 2021-01-03 | Stop reason: SDUPTHER

## 2021-01-03 RX ORDER — MORPHINE SULFATE 2 MG/ML
2 INJECTION, SOLUTION INTRAMUSCULAR; INTRAVENOUS EVERY 5 MIN PRN
Status: DISCONTINUED | OUTPATIENT
Start: 2021-01-03 | End: 2021-01-04 | Stop reason: HOSPADM

## 2021-01-03 RX ORDER — MEPERIDINE HYDROCHLORIDE 25 MG/ML
12.5 INJECTION INTRAMUSCULAR; INTRAVENOUS; SUBCUTANEOUS
Status: DISCONTINUED | OUTPATIENT
Start: 2021-01-03 | End: 2021-01-04 | Stop reason: HOSPADM

## 2021-01-03 RX ORDER — ONDANSETRON 2 MG/ML
INJECTION INTRAMUSCULAR; INTRAVENOUS PRN
Status: DISCONTINUED | OUTPATIENT
Start: 2021-01-03 | End: 2021-01-03 | Stop reason: SDUPTHER

## 2021-01-03 RX ORDER — BUPIVACAINE HYDROCHLORIDE 5 MG/ML
INJECTION, SOLUTION EPIDURAL; INTRACAUDAL PRN
Status: DISCONTINUED | OUTPATIENT
Start: 2021-01-03 | End: 2021-01-04 | Stop reason: ALTCHOICE

## 2021-01-03 RX ORDER — ONDANSETRON 2 MG/ML
4 INJECTION INTRAMUSCULAR; INTRAVENOUS ONCE
Status: COMPLETED | OUTPATIENT
Start: 2021-01-03 | End: 2021-01-03

## 2021-01-03 RX ORDER — HYDROCODONE BITARTRATE AND ACETAMINOPHEN 5; 325 MG/1; MG/1
1 TABLET ORAL EVERY 6 HOURS PRN
Qty: 12 TABLET | Refills: 0 | Status: SHIPPED | OUTPATIENT
Start: 2021-01-03 | End: 2021-01-06

## 2021-01-03 RX ORDER — MORPHINE SULFATE 2 MG/ML
1 INJECTION, SOLUTION INTRAMUSCULAR; INTRAVENOUS EVERY 5 MIN PRN
Status: DISCONTINUED | OUTPATIENT
Start: 2021-01-03 | End: 2021-01-04 | Stop reason: HOSPADM

## 2021-01-03 RX ORDER — GLYCOPYRROLATE 1 MG/5 ML
SYRINGE (ML) INTRAVENOUS PRN
Status: DISCONTINUED | OUTPATIENT
Start: 2021-01-03 | End: 2021-01-03 | Stop reason: SDUPTHER

## 2021-01-03 RX ORDER — NEOSTIGMINE METHYLSULFATE 1 MG/ML
INJECTION, SOLUTION INTRAVENOUS PRN
Status: DISCONTINUED | OUTPATIENT
Start: 2021-01-03 | End: 2021-01-03 | Stop reason: SDUPTHER

## 2021-01-03 RX ORDER — MORPHINE SULFATE 4 MG/ML
4 INJECTION, SOLUTION INTRAMUSCULAR; INTRAVENOUS ONCE
Status: COMPLETED | OUTPATIENT
Start: 2021-01-03 | End: 2021-01-03

## 2021-01-03 RX ORDER — SODIUM CHLORIDE 0.9 % (FLUSH) 0.9 %
10 SYRINGE (ML) INJECTION EVERY 12 HOURS SCHEDULED
Status: DISCONTINUED | OUTPATIENT
Start: 2021-01-03 | End: 2021-01-04 | Stop reason: HOSPADM

## 2021-01-03 RX ORDER — OXYCODONE HYDROCHLORIDE AND ACETAMINOPHEN 5; 325 MG/1; MG/1
1 TABLET ORAL PRN
Status: ACTIVE | OUTPATIENT
Start: 2021-01-03 | End: 2021-01-03

## 2021-01-03 RX ORDER — SODIUM CHLORIDE, SODIUM LACTATE, POTASSIUM CHLORIDE, CALCIUM CHLORIDE 600; 310; 30; 20 MG/100ML; MG/100ML; MG/100ML; MG/100ML
INJECTION, SOLUTION INTRAVENOUS CONTINUOUS
Status: DISCONTINUED | OUTPATIENT
Start: 2021-01-03 | End: 2021-01-04

## 2021-01-03 RX ORDER — ROCURONIUM BROMIDE 10 MG/ML
INJECTION, SOLUTION INTRAVENOUS PRN
Status: DISCONTINUED | OUTPATIENT
Start: 2021-01-03 | End: 2021-01-03 | Stop reason: SDUPTHER

## 2021-01-03 RX ORDER — FENTANYL CITRATE 50 UG/ML
INJECTION, SOLUTION INTRAMUSCULAR; INTRAVENOUS PRN
Status: DISCONTINUED | OUTPATIENT
Start: 2021-01-03 | End: 2021-01-03 | Stop reason: SDUPTHER

## 2021-01-03 RX ORDER — SODIUM CHLORIDE 0.9 % (FLUSH) 0.9 %
10 SYRINGE (ML) INJECTION PRN
Status: DISCONTINUED | OUTPATIENT
Start: 2021-01-03 | End: 2021-01-03 | Stop reason: SDUPTHER

## 2021-01-03 RX ORDER — ONDANSETRON 4 MG/1
4 TABLET, FILM COATED ORAL EVERY 8 HOURS PRN
COMMUNITY
End: 2021-02-16 | Stop reason: ALTCHOICE

## 2021-01-03 RX ORDER — PROPOFOL 10 MG/ML
INJECTION, EMULSION INTRAVENOUS PRN
Status: DISCONTINUED | OUTPATIENT
Start: 2021-01-03 | End: 2021-01-03 | Stop reason: SDUPTHER

## 2021-01-03 RX ORDER — ACETAMINOPHEN 325 MG/1
650 TABLET ORAL EVERY 6 HOURS
Status: DISCONTINUED | OUTPATIENT
Start: 2021-01-03 | End: 2021-01-04 | Stop reason: HOSPADM

## 2021-01-03 RX ORDER — OXYCODONE HYDROCHLORIDE 5 MG/1
5 TABLET ORAL EVERY 4 HOURS PRN
Status: DISCONTINUED | OUTPATIENT
Start: 2021-01-03 | End: 2021-01-04 | Stop reason: HOSPADM

## 2021-01-03 RX ORDER — 0.9 % SODIUM CHLORIDE 0.9 %
1000 INTRAVENOUS SOLUTION INTRAVENOUS ONCE
Status: COMPLETED | OUTPATIENT
Start: 2021-01-03 | End: 2021-01-03

## 2021-01-03 RX ORDER — OXYCODONE HYDROCHLORIDE 10 MG/1
10 TABLET ORAL EVERY 4 HOURS PRN
Status: DISCONTINUED | OUTPATIENT
Start: 2021-01-03 | End: 2021-01-04 | Stop reason: HOSPADM

## 2021-01-03 RX ORDER — SODIUM CHLORIDE 0.9 % (FLUSH) 0.9 %
10 SYRINGE (ML) INJECTION PRN
Status: DISCONTINUED | OUTPATIENT
Start: 2021-01-03 | End: 2021-01-04 | Stop reason: HOSPADM

## 2021-01-03 RX ADMIN — ONDANSETRON 4 MG: 2 INJECTION INTRAMUSCULAR; INTRAVENOUS at 23:53

## 2021-01-03 RX ADMIN — Medication 3 MG: at 23:05

## 2021-01-03 RX ADMIN — SODIUM CHLORIDE: 9 INJECTION, SOLUTION INTRAVENOUS at 22:34

## 2021-01-03 RX ADMIN — MIDAZOLAM 2 MG: 1 INJECTION INTRAMUSCULAR; INTRAVENOUS at 22:38

## 2021-01-03 RX ADMIN — CEFTRIAXONE SODIUM 1 G: 1 INJECTION, POWDER, FOR SOLUTION INTRAMUSCULAR; INTRAVENOUS at 12:24

## 2021-01-03 RX ADMIN — ONDANSETRON 4 MG: 2 INJECTION INTRAMUSCULAR; INTRAVENOUS at 08:20

## 2021-01-03 RX ADMIN — ROCURONIUM BROMIDE 30 MG: 10 INJECTION, SOLUTION INTRAVENOUS at 22:44

## 2021-01-03 RX ADMIN — HYDROMORPHONE HYDROCHLORIDE 0.25 MG: 1 INJECTION, SOLUTION INTRAMUSCULAR; INTRAVENOUS; SUBCUTANEOUS at 23:59

## 2021-01-03 RX ADMIN — ONDANSETRON HYDROCHLORIDE 4 MG: 2 INJECTION, SOLUTION INTRAMUSCULAR; INTRAVENOUS at 22:54

## 2021-01-03 RX ADMIN — LIDOCAINE HYDROCHLORIDE 20 MG: 20 INJECTION, SOLUTION INTRAVENOUS at 22:44

## 2021-01-03 RX ADMIN — SODIUM CHLORIDE, POTASSIUM CHLORIDE, SODIUM LACTATE AND CALCIUM CHLORIDE: 600; 310; 30; 20 INJECTION, SOLUTION INTRAVENOUS at 16:23

## 2021-01-03 RX ADMIN — SODIUM CHLORIDE 1000 ML: 9 INJECTION, SOLUTION INTRAVENOUS at 08:20

## 2021-01-03 RX ADMIN — PROPOFOL 200 MG: 10 INJECTION, EMULSION INTRAVENOUS at 22:44

## 2021-01-03 RX ADMIN — MORPHINE SULFATE 2 MG: 4 INJECTION, SOLUTION INTRAMUSCULAR; INTRAVENOUS at 08:20

## 2021-01-03 RX ADMIN — Medication 0.6 MG: at 23:05

## 2021-01-03 RX ADMIN — METRONIDAZOLE 500 MG: 500 INJECTION, SOLUTION INTRAVENOUS at 12:48

## 2021-01-03 RX ADMIN — IOPAMIDOL 90 ML: 755 INJECTION, SOLUTION INTRAVENOUS at 10:34

## 2021-01-03 RX ADMIN — FENTANYL CITRATE 100 MCG: 50 INJECTION, SOLUTION INTRAMUSCULAR; INTRAVENOUS at 22:44

## 2021-01-03 ASSESSMENT — PAIN DESCRIPTION - LOCATION
LOCATION: ABDOMEN

## 2021-01-03 ASSESSMENT — PAIN DESCRIPTION - ORIENTATION
ORIENTATION: LOWER
ORIENTATION: RIGHT;UPPER

## 2021-01-03 ASSESSMENT — PULMONARY FUNCTION TESTS
PIF_VALUE: 11
PIF_VALUE: 1
PIF_VALUE: 23
PIF_VALUE: 1
PIF_VALUE: 3
PIF_VALUE: 22
PIF_VALUE: 23
PIF_VALUE: 14
PIF_VALUE: 1
PIF_VALUE: 15
PIF_VALUE: 17
PIF_VALUE: 1
PIF_VALUE: 1
PIF_VALUE: 23
PIF_VALUE: 22
PIF_VALUE: 18
PIF_VALUE: 30
PIF_VALUE: 23
PIF_VALUE: 32
PIF_VALUE: 22
PIF_VALUE: 13
PIF_VALUE: 16
PIF_VALUE: 14
PIF_VALUE: 21

## 2021-01-03 ASSESSMENT — PAIN SCALES - GENERAL
PAINLEVEL_OUTOF10: 6
PAINLEVEL_OUTOF10: 6
PAINLEVEL_OUTOF10: 5

## 2021-01-03 ASSESSMENT — PAIN DESCRIPTION - PAIN TYPE: TYPE: SURGICAL PAIN

## 2021-01-03 NOTE — TELEPHONE ENCOUNTER
constipation, or urine problems? \"        Nausea and vomiting (emesis is yellowish mucus)    Protocols used: ABDOMINAL PAIN - MALE-ADULT-    Patient / employee reports abdominal pain that has been increasing in intensity and frequency for the past 2-3 days. He reports that his pain is now constant (5/10) and that pain levels increase when he stands or walks (9/10). Pain is centered around the navel and radiates to the RUQ. Passing a BM does not help the pain. Patient complains of nausea and vomiting (emesis is yellowish mucus). He reports feeling feverish and states that the pain feels as though \"it is pulling down from my navel when I stand\" (per pt). He is unable to walk very much due  to the pain when standing. Recommended patient to go to the ED. Provided care advice.

## 2021-01-03 NOTE — H&P
GENERAL SURGERY  HISTORY AND PHYSICAL  1/3/2021    Chief Complaint   Patient presents with    Abdominal Pain     ruq abdominal pain since thursday with n/v. no diarrhea noted. HPI  Daphney Wheeler is a 31 y.o. male who presents for evaluation of abdominal pain. Patient states that this abdominal pain started on December 31. This pain started in the patient's right upper quadrant. It was accompanied by some nausea and vomiting. This pain has gotten progressively worse. Patient describes after having a bowel movement that the pain moved to his umbilical region and felt like \"heaviness. 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 States he started experiencing a fever and took his own temperature measuring 102 Fahrenheit. History reviewed. No pertinent past medical history. History reviewed. No pertinent surgical history. Prior to Admission medications    Medication Sig Start Date End Date Taking? Authorizing Provider   methylPREDNISolone (MEDROL DOSEPACK) 4 MG tablet Take by mouth. 12/13/20   Celio Basilio DO       No Known Allergies    History reviewed. No pertinent family history. Social History     Tobacco Use    Smoking status: Never Smoker    Smokeless tobacco: Never Used   Substance Use Topics    Alcohol use: Not Currently    Drug use: Never         Review of Systems   General ROS: Positive for fever. Negative for chills  Allergy and Immunology ROS: NKDA  Hematological and Lymphatic ROS: Not on anticoagulation  Endocrine ROS: negative  Respiratory ROS: Recent Covid infection in December.   No current shortness of breath, cough  Cardiovascular ROS: no chest pain or dyspnea on exertion  Gastrointestinal ROS: SEE HPI  Genito-Urinary ROS: no dysuria, trouble voiding, or hematuria  Musculoskeletal ROS: negative for - joint swelling or muscle pain  Neurological ROS: no TIA or stroke symptoms  Dermatological ROS: negative for - rash or skin lesion changes      PHYSICAL EXAM:    Vitals:    01/03/21 0748   BP: (!) 150/100   Pulse: 104   Resp: 20   Temp: 97.5 °F (36.4 °C)   SpO2: 96%       General Appearance:  awake, alert, oriented, in no acute distress  Skin:  Skin color, texture, turgor normal. No rashes or lesions. Head/face:  NCAT  Eyes:  No gross abnormalities. Lungs:  Breathing Pattern: regular, no distress  Heart: Sinus tachycardia  Abdomen: Soft, nondistended, tenderness to palpation in the right lower quadrant and infraumbilical region. No guarding or rigidity  Extremities: Extremities warm to touch, pink, with no edema. LABS:  CBC  Recent Labs     01/03/21  0822   WBC 5.5   HGB 14.6   HCT 40.7   *     BMP  Recent Labs     01/03/21  0822      K 3.8   CL 99   CO2 28   BUN 10   CREATININE 1.0   CALCIUM 8.9     Liver Function  Recent Labs     01/03/21  0822   LIPASE 14   BILITOT 0.6   AST 21   ALT 26   ALKPHOS 58   PROT 6.5   LABALBU 4.5     Lab Results   Component Value Date    LACTA 1.1 01/03/2021         No results for input(s): INR, PTT in the last 72 hours. Invalid input(s): PT    RADIOLOGY    Ct Abdomen Pelvis W Iv Contrast Additional Contrast? None    Result Date: 1/3/2021  EXAMINATION: CT OF THE ABDOMEN AND PELVIS WITH CONTRAST 1/3/2021 10:34 am TECHNIQUE: CT of the abdomen and pelvis was performed with the administration of intravenous contrast. Multiplanar reformatted images are provided for review. Dose modulation, iterative reconstruction, and/or weight based adjustment of the mA/kV was utilized to reduce the radiation dose to as low as reasonably achievable. COMPARISON: None.  HISTORY: ORDERING SYSTEM PROVIDED HISTORY: RLQ abdominal pain concern for appendicitis TECHNOLOGIST PROVIDED HISTORY: Additional Contrast?->None Reason for exam:->RLQ abdominal pain concern for appendicitis What reading provider will be dictating this exam?->CRC FINDINGS: Lower Chest: Mild dependent atelectasis, not unusual.  No pleural effusion or pericardial effusion. Normal liver, spleen, pancreas, gallbladder, and biliary system. Normal bilateral kidneys. No hydronephrosis. The adrenal glands are not enlarged. Normal abdominal aorta and IVC. No retroperitoneal lymph node enlargement. Appendix: The appendix is relatively long and mildly dilated measuring up to 7.5 mm in diameter. Abnormal hyperenhancement of the appendiceal wall together with periappendiceal inflammatory edema in keeping with acute appendicitis. No abscess, free fluid, or pneumoperitoneum. GI tract: No obstruction. Pelvis: Normal urinary bladder. The prostate gland is not enlarged. No free fluid or lymph node enlargement. 1.  Acute appendicitis without perforation or abscess. No free fluid. 2.  Otherwise negative exam. 3.  Critical findings were discussed with Dr. Stefany Cheatham of the ER service at 1100 hours.         ASSESSMENT:  31 y.o. male with acute appendicitis    PLAN:    -Mid to general floor  -Metronidazole and Rocephin  -NPO  -IVF  -Pain control  -Nausea control  -Planning for OR later today, 1/3 for laparoscopic appendectomy    Plan discussed with Dr. Pushpa Red    Electronically signed by Kailee Stapleton DO on 1/3/21 at 12:59 PM EST    Seen/examined  For laparoscopy with appendectomy  Discussed risks and benefits  JSGadyMD

## 2021-01-03 NOTE — ED NOTES
Pt resting in room. Nad. Vss. No complaints at this time. Waiting for admission.  arianna Escobedo, CM  01/03/21 6106

## 2021-01-03 NOTE — ED NOTES
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 Denies radiating pain. He was self medicating but today it was not tolerable. He has had some nausea this week but last night was the worse its been. Pt states he hasn't ate or drank much. He stated he did not feel comfortable attempting to drive home until someone saw him. Pt denies pmh but does has a history of covid a few months ago. Vss.  Pt ready to CT. [facility]     Rasta Finn, RN  01/03/21 0645 [address], RN  01/03/21 2385

## 2021-01-03 NOTE — ED NOTES
Pt refused 2mg of morphine. He did not feel comfortable taking 4mg and only wanted 2mg. 2mg morphine wasted, 2mg morphine given.  yasmine Deleon RN  01/03/21 7115

## 2021-01-03 NOTE — ED PROVIDER NOTES
HPI:  1/3/21,   Time: 8:10 AM RAJEEV Terry is a 31 y.o. male presenting to the ED for Periumbilical abdominal , beginning 3 days ago. The complaint has been persistent, moderate in severity, and worsened by standing, walking. Patient said he started having periumbilical right upper quadrant pain on New Year's [place]. Symptoms progressed over the course of several days. Now he is nauseated vomiting mucus. He is also dry heaving. Now pain is lower in his abdomen. He has no appetite. He did report a fever at work. He works as a nurse on [employer]. There is concern for appendicitis. He has no diarrhea no close contacts ill. He has no back pain. He does not drink alcohol. Denies drug use including marijuana. He has no cough fever. He was Covid positive on December 2. ROS:   Pertinent positives and negatives are stated within HPI, all other systems reviewed and are negative.  --------------------------------------------- PAST HISTORY ---------------------------------------------  Past Medical History:  has no past medical history on file. Past Surgical History:  has a past surgical history that includes laparoscopic appendectomy (N/A, 1/3/2021). Social History:  reports that he has never smoked. He has never used smokeless tobacco. He reports previous alcohol use. He reports that he does not use drugs. Family History: family history is not on file. The patients home medications have been reviewed. Allergies: Patient has no known allergies.     -------------------------------------------------- RESULTS -------------------------------------------------  All laboratory and radiology results have been personally reviewed by myself   LABS:  Results for orders placed or performed during the hospital encounter of 01/03/21   CBC Auto Differential   Result Value Ref Range    WBC 5.5 4.5 - 11.5 E9/L    RBC 4.99 3.80 - 5.80 E12/L    Hemoglobin 14.6 12.5 - 16.5 g/dL    Hematocrit 40.7 37.0 - 54.0 %    MCV 81.6 80.0 - 99.9 fL    MCH 29.3 26.0 - 35.0 pg    MCHC 35.9 (H) 32.0 - 34.5 %    RDW 12.5 11.5 - 15.0 fL    Platelets 718 (L) 115 - 450 E9/L    MPV 11.0 7.0 - 12.0 fL    Neutrophils % 80.0 43.0 - 80.0 %    Lymphocytes % 10.4 (L) 20.0 - 42.0 %    Monocytes % 8.7 2.0 - 12.0 %    Eosinophils % 0.5 0.0 - 6.0 %    Basophils % 0.2 0.0 - 2.0 %    Neutrophils Absolute 4.40 1.80 - 7.30 E9/L    Lymphocytes Absolute 0.61 (L) 1.50 - 4.00 E9/L    Monocytes Absolute 0.50 0.10 - 0.95 E9/L    Eosinophils Absolute 0.00 (L) 0.05 - 0.50 E9/L    Basophils Absolute 0.00 0.00 - 0.20 E9/L    Atypical Lymphocytes Relative 0.9 0.0 - 4.0 %    Poikilocytes 1+     Ovalocytes 1+    Comprehensive Metabolic Panel   Result Value Ref Range    Sodium 138 132 - 146 mmol/L    Potassium 3.8 3.5 - 5.0 mmol/L    Chloride 99 98 - 107 mmol/L    CO2 28 22 - 29 mmol/L    Anion Gap 11 7 - 16 mmol/L    Glucose 103 (H) 74 - 99 mg/dL    BUN 10 6 - 20 mg/dL    CREATININE 1.0 0.7 - 1.2 mg/dL    GFR Non-African American >60 >=60 mL/min/1.73    GFR African American >60     Calcium 8.9 8.6 - 10.2 mg/dL    Total Protein 6.5 6.4 - 8.3 g/dL    Alb 4.5 3.5 - 5.2 g/dL    Total Bilirubin 0.6 0.0 - 1.2 mg/dL    Alkaline Phosphatase 58 40 - 129 U/L    ALT 26 0 - 40 U/L    AST 21 0 - 39 U/L   Lipase   Result Value Ref Range    Lipase 14 13 - 60 U/L   Lactic Acid, Plasma   Result Value Ref Range    Lactic Acid 1.1 0.5 - 2.2 mmol/L   Urinalysis   Result Value Ref Range    Color, UA Yellow Straw/Yellow    Clarity, UA Clear Clear    Glucose, Ur Negative Negative mg/dL    Bilirubin Urine Negative Negative    Ketones, Urine Negative Negative mg/dL    Specific Gravity, UA 1.025 1.005 - 1.030    Blood, Urine Negative Negative    pH, UA 7.0 5.0 - 9.0    Protein, UA Negative Negative mg/dL    Urobilinogen, Urine 0.2 <2.0 E.U./dL    Nitrite, Urine Negative Negative    Leukocyte Esterase, Urine Negative Negative   CBC auto differential   Result Value Ref Range    WBC 4.6 EXAM--------------------------------------      Constitutional/General: Alert and oriented x3, well appearing, non toxic in NAD  Head: NC/AT  Eyes: PERRL, EOMI  Mouth: Oropharynx clear, handling secretions, no trismus  Neck: Supple, full ROM, no meningeal signs  Pulmonary: Lungs clear to auscultation bilaterally, no wheezes, rales, or rhonchi. Not in respiratory distress  Cardiovascular:  Regular rate and rhythm, no murmurs, gallops, or rubs. 2+ distal pulses  Abdomen: Soft, moderate tenderness over McBurney's point. , non distended, patient has a negative Barrett sign. Extremities: Moves all extremities x 4. Warm and well perfused  Skin: warm and dry without rash  Neurologic: GCS 15, no focal deficits. Psych: Normal Affect      ------------------------------ ED COURSE/MEDICAL DECISION MAKING----------------------  Medications   oxyCODONE-acetaminophen (PERCOCET) 5-325 MG per tablet 1 tablet (has no administration in time range)     Or   oxyCODONE-acetaminophen (PERCOCET) 5-325 MG per tablet 2 tablet (has no administration in time range)   lactated ringers infusion ( Intravenous New Bag 1/4/21 0043)   0.9 % sodium chloride bolus (0 mLs Intravenous Stopped 1/3/21 1056)   ondansetron (ZOFRAN) injection 4 mg (4 mg Intravenous Given 1/3/21 0820)   morphine sulfate (PF) injection 4 mg (2 mg Intravenous Given 1/3/21 0820)   iopamidol (ISOVUE-370) 76 % injection 90 mL (90 mLs Intravenous Given 1/3/21 1034)   cefTRIAXone (ROCEPHIN) 1 g in sterile water 10 mL IV syringe (0 g Intravenous Stopped 1/3/21 1336)   metronidazole (FLAGYL) 500 mg in NaCl 100 mL IVPB premix (0 mg Intravenous Stopped 1/3/21 1413)   ondansetron (ZOFRAN) injection 4 mg (4 mg Intravenous Given 1/3/21 2353)         Medical Decision Making:    Patient presents with periumbilical right lower quadrant pain concern for appendicitis.   N.p.o. IV fluids antiemetics morphine for pain control CT with IV contrast.  Surgical consult pending work-up. Counseling: The emergency provider has spoken with the patient and discussed todays results, in addition to providing specific details for the plan of care and counseling regarding the diagnosis and prognosis. Questions are answered at this time and they are agreeable with the plan.      --------------------------------- IMPRESSION AND DISPOSITION ---------------------------------    IMPRESSION  1.  Acute appendicitis with localized peritonitis, without perforation, abscess, or gangrene        DISPOSITION  Disposition: Admit to operating room  Patient condition is stable                Ernestine Escobar DO  01/04/21 2017

## 2021-01-03 NOTE — LETTER
Edwar Gan was seen and treated in our hospital and discharged on on 1/4/2021. He may return to work on 1/11/2021 with limited activity. He will be cleared for full work activity by Dr. Dia Barry after a follow up appointment. If you have any questions or concerns, please don't hesitate to call.     Electronically signed by Karina Hernandez MD on 1/4/21 at 6:09 AM EST

## 2021-01-04 VITALS
BODY MASS INDEX: 27.28 KG/M2 | TEMPERATURE: 98.2 F | DIASTOLIC BLOOD PRESSURE: 60 MMHG | RESPIRATION RATE: 16 BRPM | SYSTOLIC BLOOD PRESSURE: 97 MMHG | OXYGEN SATURATION: 96 % | HEART RATE: 89 BPM | HEIGHT: 68 IN | WEIGHT: 180 LBS

## 2021-01-04 LAB
ANION GAP SERPL CALCULATED.3IONS-SCNC: 7 MMOL/L (ref 7–16)
BASOPHILS ABSOLUTE: 0.02 E9/L (ref 0–0.2)
BASOPHILS RELATIVE PERCENT: 0.4 % (ref 0–2)
BUN BLDV-MCNC: 6 MG/DL (ref 6–20)
CALCIUM SERPL-MCNC: 8.8 MG/DL (ref 8.6–10.2)
CHLORIDE BLD-SCNC: 102 MMOL/L (ref 98–107)
CO2: 29 MMOL/L (ref 22–29)
CREAT SERPL-MCNC: 1 MG/DL (ref 0.7–1.2)
EOSINOPHILS ABSOLUTE: 0.06 E9/L (ref 0.05–0.5)
EOSINOPHILS RELATIVE PERCENT: 1.3 % (ref 0–6)
GFR AFRICAN AMERICAN: >60
GFR NON-AFRICAN AMERICAN: >60 ML/MIN/1.73
GLUCOSE BLD-MCNC: 96 MG/DL (ref 74–99)
HCT VFR BLD CALC: 39 % (ref 37–54)
HEMOGLOBIN: 13.9 G/DL (ref 12.5–16.5)
IMMATURE GRANULOCYTES #: 0.01 E9/L
IMMATURE GRANULOCYTES %: 0.2 % (ref 0–5)
LYMPHOCYTES ABSOLUTE: 1.52 E9/L (ref 1.5–4)
LYMPHOCYTES RELATIVE PERCENT: 33 % (ref 20–42)
MCH RBC QN AUTO: 29.2 PG (ref 26–35)
MCHC RBC AUTO-ENTMCNC: 35.6 % (ref 32–34.5)
MCV RBC AUTO: 81.9 FL (ref 80–99.9)
MONOCYTES ABSOLUTE: 0.51 E9/L (ref 0.1–0.95)
MONOCYTES RELATIVE PERCENT: 11.1 % (ref 2–12)
NEUTROPHILS ABSOLUTE: 2.49 E9/L (ref 1.8–7.3)
NEUTROPHILS RELATIVE PERCENT: 54 % (ref 43–80)
PDW BLD-RTO: 12.5 FL (ref 11.5–15)
PLATELET # BLD: 121 E9/L (ref 130–450)
PMV BLD AUTO: 10.7 FL (ref 7–12)
POTASSIUM REFLEX MAGNESIUM: 3.8 MMOL/L (ref 3.5–5)
RBC # BLD: 4.76 E12/L (ref 3.8–5.8)
SODIUM BLD-SCNC: 138 MMOL/L (ref 132–146)
WBC # BLD: 4.6 E9/L (ref 4.5–11.5)

## 2021-01-04 PROCEDURE — 2700000000 HC OXYGEN THERAPY PER DAY

## 2021-01-04 PROCEDURE — 85025 COMPLETE CBC W/AUTO DIFF WBC: CPT

## 2021-01-04 PROCEDURE — 6370000000 HC RX 637 (ALT 250 FOR IP): Performed by: STUDENT IN AN ORGANIZED HEALTH CARE EDUCATION/TRAINING PROGRAM

## 2021-01-04 PROCEDURE — 80048 BASIC METABOLIC PNL TOTAL CA: CPT

## 2021-01-04 PROCEDURE — G0378 HOSPITAL OBSERVATION PER HR: HCPCS

## 2021-01-04 PROCEDURE — 2580000003 HC RX 258: Performed by: STUDENT IN AN ORGANIZED HEALTH CARE EDUCATION/TRAINING PROGRAM

## 2021-01-04 PROCEDURE — 36415 COLL VENOUS BLD VENIPUNCTURE: CPT

## 2021-01-04 RX ORDER — SODIUM CHLORIDE, SODIUM LACTATE, POTASSIUM CHLORIDE, CALCIUM CHLORIDE 600; 310; 30; 20 MG/100ML; MG/100ML; MG/100ML; MG/100ML
INJECTION, SOLUTION INTRAVENOUS CONTINUOUS
Status: ACTIVE | OUTPATIENT
Start: 2021-01-04 | End: 2021-01-04

## 2021-01-04 RX ADMIN — SODIUM CHLORIDE, POTASSIUM CHLORIDE, SODIUM LACTATE AND CALCIUM CHLORIDE: 600; 310; 30; 20 INJECTION, SOLUTION INTRAVENOUS at 00:43

## 2021-01-04 RX ADMIN — ACETAMINOPHEN 650 MG: 325 TABLET ORAL at 04:13

## 2021-01-04 RX ADMIN — ACETAMINOPHEN 650 MG: 325 TABLET ORAL at 10:30

## 2021-01-04 ASSESSMENT — PAIN SCALES - GENERAL
PAINLEVEL_OUTOF10: 0
PAINLEVEL_OUTOF10: 5

## 2021-01-04 ASSESSMENT — PAIN DESCRIPTION - PAIN TYPE: TYPE: SURGICAL PAIN

## 2021-01-04 NOTE — PROGRESS NOTES
GENERAL SURGERY  DAILY PROGRESS NOTE  1/4/2021    Subjective:  Pt feeling better this morning.  Some incisional tenderness but tolerated diet with no n/v.     Objective:  /74   Pulse 86   Temp 98.8 °F (37.1 °C) (Oral)   Resp 16   Ht 5' 8\" (1.727 m)   Wt 180 lb (81.6 kg)   SpO2 96%   BMI 27.37 kg/m²     GENERAL:  Laying in bed, awake, alert, cooperative, no apparent distress  HEAD: Normocephalic, atraumatic  EYES: No sclera icterus, pupils equal  LUNGS:  No increased work of breathing  CARDIOVASCULAR:  RRR  ABDOMEN:  Soft, non- distended, appropriate incisional tenderness  EXTREMITIES: No edema or swelling  SKIN: Warm and dry    Assessment/Plan:  31 y.o. male with acute appendicitis s/p laparoscopic appendectomy     - Advance diet  - ok to discharge, will follow up with Dr. Alie Jordan    Electronically signed by Rai Mathews MD on 1/4/2021 at 6:04 AM

## 2021-01-04 NOTE — PLAN OF CARE
Problem: Pain:  Goal: Pain level will decrease  Description: Pain level will decrease  1/4/2021 0959 by Chantel Rutherford RN  Outcome: Met This Shift  1/3/2021 2253 by Horacio Carlson RN  Outcome: Met This Shift  Goal: Control of acute pain  Description: Control of acute pain  1/4/2021 0959 by Chantel Rutherford RN  Outcome: Met This Shift  1/3/2021 2253 by Horacio Carlson RN  Outcome: Met This Shift  Goal: Control of chronic pain  Description: Control of chronic pain  Outcome: Met This Shift     Problem: Falls - Risk of:  Goal: Will remain free from falls  Description: Will remain free from falls  Outcome: Met This Shift  Goal: Absence of physical injury  Description: Absence of physical injury  Outcome: Met This Shift

## 2021-01-04 NOTE — CARE COORDINATION
Patient currently on 2L NC-will need ambulating pulse ox testing. Pasted in chart for bedside RN to complete.   Electronically signed by Otto Hahn RN on 1/4/2021 at 8:18 AM

## 2021-01-04 NOTE — PLAN OF CARE
Problem: Pain:  Goal: Pain level will decrease  Description: Pain level will decrease  1/3/2021 2253 by Britta Heredia, RN  Outcome: Met This Shift     Problem: Pain:  Goal: Control of acute pain  Description: Control of acute pain  1/3/2021 2253 by Britta Heredia, RN  Outcome: Met This Shift

## 2021-01-04 NOTE — ANESTHESIA POSTPROCEDURE EVALUATION
Department of Anesthesiology  Postprocedure Note    Patient: Marion Babb  MRN: [id number]  YOB: 1990  Date of evaluation: 1/4/2021  Time:  6:22 AM     Procedure Summary     Date: 01/03/21 Room / Location: [place] [address] / [facility]    Anesthesia Start: 2238 Anesthesia Stop: 2323    Procedure: APPENDECTOMY LAPAROSCOPIC (N/A Abdomen) Diagnosis: (appendicitis)    Surgeons: Mi Szymanski MD Responsible Provider: Zuleyka Walter DO    Anesthesia Type: general ASA Status: 1 - Emergent          Anesthesia Type: general    Carissa Phase I: Carissa Score: 9    Carissa Phase II:      Last vitals: Reviewed and per EMR flowsheets.        Anesthesia Post Evaluation    Patient location during evaluation: PACU  Patient participation: complete - patient participated  Level of consciousness: awake and alert  Airway patency: patent  Nausea & Vomiting: no nausea and no vomiting  Complications: no  Cardiovascular status: blood pressure returned to baseline  Respiratory status: acceptable  Hydration status: euvolemic

## 2021-01-04 NOTE — ANESTHESIA PRE PROCEDURE
Department of Anesthesiology  Preprocedure Note       Name:  Priyank Guadarrama   Age:  31 y.o.  :  1990                                          MRN:  [id number]         Date:  1/3/2021      Surgeon: Laura Olivier):  Dipika Trevino MD    Procedure: Procedure(s):  APPENDECTOMY LAPAROSCOPIC    Medications prior to admission:   Prior to Admission medications    Medication Sig Start Date End Date Taking?  Authorizing Provider   ondansetron (ZOFRAN) 4 MG tablet Take 4 mg by mouth every 8 hours as needed for Nausea or Vomiting   Yes Historical Provider, MD       Current medications:    Current Facility-Administered Medications   Medication Dose Route Frequency Provider Last Rate Last Admin    sodium chloride flush 0.9 % injection 10 mL  10 mL Intravenous 2 times per day Thad Martinez DO        sodium chloride flush 0.9 % injection 10 mL  10 mL Intravenous PRN Thad Martinez DO        ondansetron (ZOFRAN-ODT) disintegrating tablet 4 mg  4 mg Oral Q8H PRN Thad Martinez DO        Or    ondansetron (ZOFRAN) injection 4 mg  4 mg Intravenous Q6H PRN Thad Martinez DO        enoxaparin (LOVENOX) injection 40 mg  40 mg Subcutaneous Daily Thad Martinez DO        acetaminophen (TYLENOL) tablet 650 mg  650 mg Oral Q6H Thad Martinez DO        oxyCODONE (ROXICODONE) immediate release tablet 5 mg  5 mg Oral Q4H PRN Thad Martinez DO        Or    oxyCODONE HCl (OXY-IR) immediate release tablet 10 mg  10 mg Oral Q4H PRN Thad Martinez DO        HYDROmorphone (DILAUDID) injection 0.25 mg  0.25 mg Intravenous Q3H PRN Thad Martinez DO        Or    HYDROmorphone (DILAUDID) injection 0.5 mg  0.5 mg Intravenous Q3H PRN Thad Martinez DO        lactated ringers infusion   Intravenous Continuous Thad Martinez  mL/hr at 21 1623 New Bag at 21 1623    [START ON 2021] cefTRIAXone (ROCEPHIN) 1 g in sterile water 10 mL IV syringe  1 g Intravenous Q24H Elen Menjivar MD  metronidazole (FLAGYL) 500 mg in NaCl 100 mL IVPB premix  500 mg Intravenous Q8H Pattie Bunch MD        meperidine (DEMEROL) injection 12.5 mg  12.5 mg Intravenous Q15 Min PRN Isabela Herb Finamore, DO        HYDROmorphone (DILAUDID) injection 0.25 mg  0.25 mg Intravenous Q5 Min PRN Isabela Herb Finamore, DO        HYDROmorphone (DILAUDID) injection 0.5 mg  0.5 mg Intravenous Q5 Min PRN Durward Fennel, DO        morphine (PF) injection 1 mg  1 mg Intravenous Q5 Min PRN Durward Fennel, DO        morphine (PF) injection 2 mg  2 mg Intravenous Q5 Min PRN Durward Fennel, DO        oxyCODONE-acetaminophen (PERCOCET) 5-325 MG per tablet 1 tablet  1 tablet Oral PRN Durward Fennel, DO        Or    oxyCODONE-acetaminophen (PERCOCET) 5-325 MG per tablet 2 tablet  2 tablet Oral PRN Durward Fennel, DO        ondansetron [facility]) injection 4 mg  4 mg Intravenous Once PRN Durward Fennel, DO           Allergies:  No Known Allergies    Problem List:    Patient Active Problem List   Diagnosis Code    Acute appendicitis K35.80       Past Medical History:  History reviewed. No pertinent past medical history. Past Surgical History:  History reviewed. No pertinent surgical history.     Social History:    Social History     Tobacco Use    Smoking status: Never Smoker    Smokeless tobacco: Never Used   Substance Use Topics    Alcohol use: Not Currently                                Counseling given: Not Answered      Vital Signs (Current):   Vitals:    01/03/21 0748 01/03/21 1300 01/03/21 1600 01/03/21 1615   BP: (!) 150/100 122/69 (!) 105/53 100/80   Pulse: 104   86   Resp: 20   20   Temp: 97.5 °F (36.4 °C)   99.2 °F (37.3 °C)   TempSrc: Temporal   Temporal   SpO2: 96% 100%  96%   Weight: 180 lb (81.6 kg)      Height: 5' 8\" (1.727 m)                                                 BP Readings from Last 3 Encounters:   01/03/21 100/80   12/13/20 112/78   12/03/20 123/61 NPO Status: Time of last liquid consumption: 0200                        Time of last solid consumption: 2100                        Date of last liquid consumption: 01/03/21                        Date of last solid food consumption: 01/02/21    BMI:   Wt Readings from Last 3 Encounters:   01/03/21 180 lb (81.6 kg)   12/13/20 180 lb (81.6 kg)   12/03/20 180 lb (81.6 kg)     Body mass index is 27.37 kg/m². CBC:   Lab Results   Component Value Date    WBC 5.5 01/03/2021    RBC 4.99 01/03/2021    HGB 14.6 01/03/2021    HCT 40.7 01/03/2021    MCV 81.6 01/03/2021    RDW 12.5 01/03/2021     01/03/2021       CMP:   Lab Results   Component Value Date     01/03/2021    K 3.8 01/03/2021    CL 99 01/03/2021    CO2 28 01/03/2021    BUN 10 01/03/2021    CREATININE 1.0 01/03/2021    GFRAA >60 01/03/2021    LABGLOM >60 01/03/2021    GLUCOSE 103 01/03/2021    PROT 6.5 01/03/2021    CALCIUM 8.9 01/03/2021    BILITOT 0.6 01/03/2021    ALKPHOS 58 01/03/2021    AST 21 01/03/2021    ALT 26 01/03/2021       POC Tests: No results for input(s): POCGLU, POCNA, POCK, POCCL, POCBUN, POCHEMO, POCHCT in the last 72 hours.     Coags: No results found for: PROTIME, INR, APTT    HCG (If Applicable): No results found for: PREGTESTUR, PREGSERUM, HCG, HCGQUANT     ABGs: No results found for: PHART, PO2ART, NPQ2XZO, JXK4QLP, BEART, E4ICGOPL     Type & Screen (If Applicable):  No results found for: LABABO, LABRH    Drug/Infectious Status (If Applicable):  No results found for: HIV, HEPCAB    COVID-19 Screening (If Applicable):   Lab Results   Component Value Date    COVID19 Detected 12/02/2020    COVID19 Not Detected 09/17/2020         Anesthesia Evaluation  Patient summary reviewed no history of anesthetic complications:   Airway: Mallampati: II  TM distance: >3 FB   Neck ROM: full  Mouth opening: > = 3 FB Dental: normal exam         Pulmonary:Negative Pulmonary ROS and normal exam  breath sounds clear to auscultation

## 2021-01-04 NOTE — DISCHARGE SUMMARY
Physician Discharge Summary     Patient ID:  Daphney Wheeler  [id number]  73 y.o.  1990    Admit date: 1/3/2021    Discharge date and time: 1/4/2021 11:37 AM     Admitting Physician: Tsering Gerardo MD     Admission Diagnoses: Acute appendicitis [K35.80]    Discharge Diagnoses: Active Problems:    Acute appendicitis  Resolved Problems:    * No resolved hospital problems. *      Admission Condition: fair    Discharged Condition: stable      Hospital Course:  Daphney Wheeler is a 31 y.o. male who presented with abdominal pain. Work up revealed acute appendicitis and he underwent laparoscopic cholecystectomy. He progressed well, pain was controlled on PO medications. He was tolerating a regular diet with no nausea or vomiting, and was in a suitable condition for discharge to home in stable condition. Consults:   IP CONSULT TO GENERAL SURGERY  IP CONSULT TO GENERAL SURGERY    Significant Diagnostic Studies:   Ct Abdomen Pelvis W Iv Contrast Additional Contrast? None    Result Date: 1/3/2021  EXAMINATION: CT OF THE ABDOMEN AND PELVIS WITH CONTRAST 1/3/2021 10:34 am TECHNIQUE: CT of the abdomen and pelvis was performed with the administration of intravenous contrast. Multiplanar reformatted images are provided for review. Dose modulation, iterative reconstruction, and/or weight based adjustment of the mA/kV was utilized to reduce the radiation dose to as low as reasonably achievable. COMPARISON: None. HISTORY: ORDERING SYSTEM PROVIDED HISTORY: RLQ abdominal pain concern for appendicitis TECHNOLOGIST PROVIDED HISTORY: Additional Contrast?->None Reason for exam:->RLQ abdominal pain concern for appendicitis What reading provider will be dictating this exam?->CRC FINDINGS: Lower Chest: Mild dependent atelectasis, not unusual.  No pleural effusion or pericardial effusion. Normal liver, spleen, pancreas, gallbladder, and biliary system. Normal bilateral kidneys. No hydronephrosis.   The adrenal glands are not enlarged. Normal abdominal aorta and IVC. No retroperitoneal lymph node enlargement. Appendix: The appendix is relatively long and mildly dilated measuring up to 7.5 mm in diameter. Abnormal hyperenhancement of the appendiceal wall together with periappendiceal inflammatory edema in keeping with acute appendicitis. No abscess, free fluid, or pneumoperitoneum. GI tract: No obstruction. Pelvis: Normal urinary bladder. The prostate gland is not enlarged. No free fluid or lymph node enlargement. 1.  Acute appendicitis without perforation or abscess. No free fluid. 2.  Otherwise negative exam. 3.  Critical findings were discussed with Dr. Karen Lopes of the ER service at 1100 hours. Discharge Exam:  GENERAL:  Laying in bed, awake, alert, cooperative, no apparent distress  HEAD: Normocephalic, atraumatic  EYES: No sclera icterus, pupils equal  LUNGS:  No increased work of breathing  CARDIOVASCULAR:  RRR  ABDOMEN:  Soft, non- distended, appropriate incisional tenderness  EXTREMITIES: No edema or swelling  SKIN: Warm and dry    Disposition: home    In process/preliminary results:  Outstanding Order Results     Date and Time Order Name Status Description    1/3/2021 0000 Surgical Pathology In process           Patient Instructions:   Discharge Medication List as of 1/4/2021  9:36 AM      START taking these medications    Details   HYDROcodone-acetaminophen (NORCO) 5-325 MG per tablet Take 1 tablet by mouth every 6 hours as needed for Pain for up to 3 days. Intended supply: 3 days.  Take lowest dose possible to manage pain, Disp-12 tablet, R-0Print         CONTINUE these medications which have NOT CHANGED    Details   ondansetron (ZOFRAN) 4 MG tablet Take 4 mg by mouth every 8 hours as needed for Nausea or VomitingHistorical Med             Appendectomy: What to Expect at [facility] doctor removed your appendix either by making many small cuts, called incisions, in your belly (laparoscopic surgery) or through open surgery. In open surgery, the doctor makes one large incision. The incisions leave scars that usually fade over time. After your surgery, it is normal to feel weak and tired for several days after you return home. Your belly may be swollen and may be painful. If you had laparoscopic surgery, you may have pain in your shoulder for about 24 hours. You may also feel sick to your stomach and have diarrhea, constipation, gas, or a headache. This usually goes away in a few days. Your recovery time depends on the type of surgery you had. If you had laparoscopic surgery, you will probably be able to return to work or a normal routine 1 to 3 weeks after surgery. If you had an open surgery, it may take 2 to 4 weeks. If your appendix ruptured, you may have a drain in your incision. Your body will work fine without an appendix. You won't have to make any changes in your diet or lifestyle. This care sheet gives you a general idea about how long it will take for you to recover. But each person recovers at a different pace. Follow the steps below to get better as quickly as possible. How can you care for yourself at home? Activity    · Rest when you feel tired. Getting enough sleep will help you recover.     · Try to walk each day. Start by walking a little more than you did the day before. Bit by bit, increase the amount you walk. Walking boosts blood flow and helps prevent pneumonia and constipation.     · For about 2 weeks, avoid lifting anything that would make you strain. This may include a child, heavy grocery bags and milk containers, a heavy briefcase or backpack, cat litter or dog food bags, or a vacuum .     · Avoid strenuous activities, such as bicycle riding, jogging, weight lifting, or aerobic exercise, until your doctor says it is okay.     · You may be able to take showers (unless you have a drain near your incision) 24 to 48 hours after surgery. Pat the incision dry.  Do not take a bath ibuprofen (Advil, Motrin), or naproxen (Aleve). Read and follow all instructions on the label. ? Do not take two or more pain medicines at the same time unless the doctor told you to. Many pain medicines have acetaminophen, which is Tylenol. Too much Tylenol can be harmful.     · If you think your pain medicine is making you sick to your stomach:  ? Take your medicine after meals (unless your doctor has told you not to). ? Ask your doctor for a different pain medicine. Incision care    · If you had an open surgery, you may have staples in your incision. The doctor will take these out in 7 to 10 days.     · If you have strips of tape on the incision, leave the tape on for a week or until it falls off.     · You may wash the area with warm, soapy water 24 to 48 hours after your surgery, unless your doctor tells you not to. Pat the area dry.     · Keep the area clean and dry. You may cover it with a gauze bandage if it weeps or rubs against clothing. Change the bandage every day.     · If your appendix ruptured, you may have an incision with packing in it. Change the packing as often as your doctor tells you to. ? Packing changes may hurt at first. Taking pain medicine about half an hour before you change the dressing can help. ? If your dressing sticks to your wound, try soaking it with warm water for about 10 minutes before you remove it. You can do this in the shower or by placing a wet washcloth over the dressing. ? Remove the old packing and flush the incision with water. Gently pat the top area dry. ? The size of the incision determines how much gauze you need to put inside. Fold the gauze over once, but do not wad it up so that it hurts. Put it in the wound carefully. You want to keep the sides of the wound from touching. A cotton swab may help you push the gauze in as needed. ? Put a gauze pad over the wound, and tape it down. ?  You may notice greenish gray fluid seeping from your wound as you start to heal. This is normal. It is a sign that your wound is healing. Follow-up care is a key part of your treatment and safety. Be sure to make and go to all appointments, and call your doctor if you are having problems. It's also a good idea to know your test results and keep a list of the medicines you take. When should you call for help? Call 911 anytime you think you may need emergency care. For example, call if:    · You passed out (lost consciousness).     · You are short of breath. .   Call your doctor now or seek immediate medical care if:    · You are sick to your stomach and cannot drink fluids.     · You cannot pass stools or gas.     · You have pain that does not get better when you take your pain medicine.     · You have signs of infection, such as:  ? Increased pain, swelling, warmth, or redness. ? Red streaks leading from the wound. ? Pus draining from the wound. ? A fever.     · You have loose stitches, or your incision comes open.     · Bright red blood has soaked through the bandage over your incision.     · You have signs of a blood clot in your leg (called a deep vein thrombosis), such as:  ? Pain in your calf, back of knee, thigh, or groin. ? Redness and swelling in your leg or groin. Watch closely for any changes in your health, and be sure to contact your doctor if you have any problems. Where can you learn more? Go to https://[employer]. org and sign in to your [employer] account. Enter F164 in the [facility] box to learn more about \"Appendectomy: What to Expect at Home. \"     If you do not have an account, please click on the \"Sign Up Now\" link. Current as of: April 15, 2020               Content Version: 12.6  © 5927-0014 [employer]. Care instructions adapted under license by [employer].  If you have questions about a medical condition or this instruction, always ask your healthcare professional. [address] any warranty or liability for your use of this information.       Follow up:   Mirna Landis MD  [address] [facility] [address] [place] [phone]    Schedule an appointment as soon as possible for a visit in 2 weeks  For wound re-check       Signed:  Francisco Munson MD  1/4/2021  5:36 PM

## 2021-01-04 NOTE — PROGRESS NOTES
Mariee cath inserted prior to case by Dr Sushil Wilkinson. Mariee cath removed at end of case by Dr Sushil Wilkinson. PACU ntfd of above

## 2021-01-05 ENCOUNTER — CARE COORDINATION (OUTPATIENT)
Dept: OTHER | Facility: CLINIC | Age: 31
End: 2021-01-05

## 2021-01-05 RX ORDER — DOCUSATE SODIUM 100 MG/1
100 CAPSULE, LIQUID FILLED ORAL 2 TIMES DAILY
COMMUNITY
End: 2021-02-16 | Stop reason: ALTCHOICE

## 2021-01-13 ENCOUNTER — CARE COORDINATION (OUTPATIENT)
Dept: OTHER | Facility: CLINIC | Age: 31
End: 2021-01-13

## 2021-01-13 NOTE — CARE COORDINATION
ACM attempted to reach patient for follow up call regarding CT follow-up call. HIPAA compliant message left requesting a return phone call at patients convenience. Will continue to follow. DEVIKA Doyle, RN  Associate Care Manager   Cell: [phone]  [email or web address]

## 2021-01-20 ENCOUNTER — CARE COORDINATION (OUTPATIENT)
Dept: OTHER | Facility: CLINIC | Age: 31
End: 2021-01-20

## 2021-01-20 NOTE — CARE COORDINATION
ACM attempted to reach patient for follow up call regarding CT follow-up call. HIPAA compliant message left requesting a return phone call at patients convenience. Will continue to follow. DEVIKA Avila, RN  Associate Care Manager   Cell: [phone]  [email or web address]

## 2021-01-28 ENCOUNTER — CARE COORDINATION (OUTPATIENT)
Dept: OTHER | Facility: CLINIC | Age: 31
End: 2021-01-28

## 2021-01-28 NOTE — CARE COORDINATION
ACM attempted to reach patient for follow up call regarding CT follow-up call. HIPAA compliant message left requesting a return phone call at patients convenience. Will continue to follow. DEVIKA Wolff, RN  Associate Care Manager   Cell: [phone]  [email or web address]

## 2021-02-03 ENCOUNTER — CARE COORDINATION (OUTPATIENT)
Dept: OTHER | Facility: CLINIC | Age: 31
End: 2021-02-03

## 2021-02-03 NOTE — CARE COORDINATION
ACM attempted to reach patient for follow up call regarding CT follow-up outreach. HIPAA compliant message left requesting a return phone call at patients convenience. Will continue to follow. No further outreach scheduled with this ACM, ACM will sign off care team at this time. Episode of Care resolved. Patient has this ACM's contact information if future needs arise. DEVIKA Gruber, RN  Associate Care Manager   Cell: [phone]  [email or web address]

## 2021-02-16 ENCOUNTER — OFFICE VISIT (OUTPATIENT)
Dept: FAMILY MEDICINE CLINIC | Age: 31
End: 2021-02-16
Payer: COMMERCIAL

## 2021-02-16 VITALS
SYSTOLIC BLOOD PRESSURE: 122 MMHG | DIASTOLIC BLOOD PRESSURE: 78 MMHG | TEMPERATURE: 98.6 F | BODY MASS INDEX: 30.46 KG/M2 | WEIGHT: 201 LBS | HEART RATE: 89 BPM | OXYGEN SATURATION: 98 % | RESPIRATION RATE: 18 BRPM | HEIGHT: 68 IN

## 2021-02-16 DIAGNOSIS — N34.2 URETHRITIS: ICD-10-CM

## 2021-02-16 DIAGNOSIS — N34.2 URETHRITIS: Primary | ICD-10-CM

## 2021-02-16 LAB
BILIRUBIN, POC: NEGATIVE
BLOOD URINE, POC: NEGATIVE
CLARITY, POC: CLEAR
COLOR, POC: YELLOW
GLUCOSE URINE, POC: NEGATIVE
KETONES, POC: NEGATIVE
LEUKOCYTE EST, POC: NEGATIVE
NITRITE, POC: NEGATIVE
PH, POC: 5.5
PROTEIN, POC: NEGATIVE
SPECIFIC GRAVITY, POC: >=1.03
UROBILINOGEN, POC: 0.2

## 2021-02-16 PROCEDURE — 81002 URINALYSIS NONAUTO W/O SCOPE: CPT | Performed by: PHYSICIAN ASSISTANT

## 2021-02-16 PROCEDURE — 99214 OFFICE O/P EST MOD 30 MIN: CPT | Performed by: PHYSICIAN ASSISTANT

## 2021-02-16 RX ORDER — DOXYCYCLINE HYCLATE 100 MG
100 TABLET ORAL 2 TIMES DAILY
Qty: 28 TABLET | Refills: 0 | Status: SHIPPED | OUTPATIENT
Start: 2021-02-16 | End: 2021-03-02

## 2021-02-16 NOTE — PROGRESS NOTES
21  Daphney Wheeler : 1990 Sex: male  Age 31 y.o. Subjective:  Chief Complaint   Patient presents with    Other     pt states ureaplasma          HPI:   Daphney Wheeler , 31 y.o. male presents to express care for evaluation of possibly Ureaplasma infection. The patient states that his girlfriend recently had some testing done for blood in the urine and was found to have Ureaplasma. The patient was recommended to be evaluated here in the office and have urinalysis and started on doxycycline. The patient is currently asymptomatic. The patient is not having any testicular pain. The patient is not having any burning with urination. No urethral drainage or discharge. Denies chance of sexually transmitted infection. The patient is not having any other complaints at this time. ROS:   Unless otherwise stated in this report the patient's positive and negative responses for review of systems for constitutional, eyes, ENT, cardiovascular, respiratory, gastrointestinal, neurological, , musculoskeletal, and integument systems and related systems to the presenting problem are either stated in the history of present illness or were not pertinent or were negative for the symptoms and/or complaints related to the presenting medical problem. Positives and pertinent negatives as per HPI. All others reviewed and are negative. PMH:   History reviewed. No pertinent past medical history. Past Surgical History:   Procedure Laterality Date    LAPAROSCOPIC APPENDECTOMY N/A 1/3/2021    APPENDECTOMY LAPAROSCOPIC performed by Tsering Gerardo MD at [address] [place] [address]       History reviewed. No pertinent family history.     Medications:     Current Outpatient Medications:     doxycycline hyclate (VIBRA-TABS) 100 MG tablet, Take 1 tablet by mouth 2 times daily for 14 days, Disp: 28 tablet, Rfl: 0    Allergies:   No Known Allergies    Social History:     Social History     Tobacco Use  Smoking status: Never Smoker    Smokeless tobacco: Never Used   Substance Use Topics    Alcohol use: Not Currently    Drug use: Never       Patient lives at home. Physical Exam:     Vitals:    02/16/21 0914   BP: 122/78   Pulse: 89   Resp: 18   Temp: 98.6 °F (37 °C)   SpO2: 98%   Weight: 201 lb (91.2 kg)   Height: 5' 8\" (1.727 m)       Exam:  Physical Exam  Nurses note and vital signs reviewed and patient is not hypoxic. General: The patient appears well and in no apparent distress. Patient is resting comfortably on cart. Skin: Warm, dry, no pallor noted. There is no rash noted. Head: Normocephalic, atraumatic. Eye: Normal conjunctiva  Ears, Nose, Mouth, and Throat: Wearing a mask  Cardiovascular: Regular Rate and Rhythm  Respiratory: Patient is in no distress, no accessory muscle use, lungs are clear to auscultation, no wheezing, crackles or rhonchi  GI: Normal bowel sounds, no tenderness to palpation, no masses appreciated. No rebound, guarding, or rigidity noted. Musculoskeletal: No obvious deformity noted to the upper or lower extremities  Neurological: A&O x4, normal speech        Testing:     Results for orders placed or performed in visit on 02/16/21   POCT Urinalysis no Micro   Result Value Ref Range    Color, UA yellow     Clarity, UA clear     Glucose, UA POC negative     Bilirubin, UA negative     Ketones, UA negative     Spec Grav, UA >=1.030     Blood, UA POC negative     pH, UA 5.5     Protein, UA POC negative     Urobilinogen, UA 0.2     Leukocytes, UA negative     Nitrite, UA negative          Medical Decision Making:     Vital signs reviewed    Past medical history reviewed. Allergies reviewed. Medications reviewed. Patient on arrival does not appear to be in any apparent distress or discomfort. The patient is noted to be afebrile and nontoxic appearing. The patient had UA that did not show any evidence of urinary tract infection. Slightly elevated specific gravity. We will set up a urine culture. We will treat the patient with doxycycline 100 mg twice daily for the next 14 days. Patient will continue to monitor signs and symptoms. Follow-up with PCP. We will also send off a GC and chlamydia    The patient is to follow up with PCP for results and we will make any necessary adjustments to the patient's medication regimen. The patient will go directly to ED if notes nausea, vomiting, back pain, fever, chills or worsening symptoms. The patient has no other concerns at this time. Clinical Impression:   Rhoda Gibson was seen today for other. Diagnoses and all orders for this visit:    Urethritis  -     POCT Urinalysis no Micro  -     Culture, Urine; Future  -     C.trachomatis N.gonorrhoeae DNA, Urine; Future    Other orders  -     doxycycline hyclate (VIBRA-TABS) 100 MG tablet; Take 1 tablet by mouth 2 times daily for 14 days        The patient is to call for any concerns or return if any of the signs or symptoms worsen. The patient is to follow-up with PCP in the next 2-3 days for repeat evaluation repeat assessment or go directly to the emergency department.      SIGNATURE: Jason Andino III, PA-C

## 2021-02-19 LAB
C. TRACHOMATIS DNA ,URINE: NEGATIVE
N. GONORRHOEAE DNA, URINE: NEGATIVE
SOURCE: NORMAL
URINE CULTURE, ROUTINE: NORMAL

## 2021-04-26 ENCOUNTER — HOSPITAL ENCOUNTER (INPATIENT)
Age: 31
LOS: 7 days | Discharge: HOME | DRG: 885 | End: 2021-05-03
Attending: EMERGENCY MEDICINE | Admitting: PSYCHIATRY & NEUROLOGY
Payer: COMMERCIAL

## 2021-04-26 DIAGNOSIS — F41.9 ANXIETY: Primary | ICD-10-CM

## 2021-04-26 PROBLEM — F32.9 MAJOR DEPRESSION, SINGLE EPISODE: Status: ACTIVE | Noted: 2021-04-26

## 2021-04-26 LAB
ACETAMINOPHEN LEVEL: <5 MCG/ML (ref 10–30)
ALBUMIN SERPL-MCNC: 5.2 G/DL (ref 3.5–5.2)
ALP BLD-CCNC: 50 U/L (ref 40–129)
ALT SERPL-CCNC: 23 U/L (ref 0–40)
AMPHETAMINE SCREEN, URINE: NOT DETECTED
ANION GAP SERPL CALCULATED.3IONS-SCNC: 15 MMOL/L (ref 7–16)
AST SERPL-CCNC: 26 U/L (ref 0–39)
BARBITURATE SCREEN URINE: NOT DETECTED
BASOPHILS ABSOLUTE: 0.05 E9/L (ref 0–0.2)
BASOPHILS RELATIVE PERCENT: 1 % (ref 0–2)
BENZODIAZEPINE SCREEN, URINE: NOT DETECTED
BILIRUB SERPL-MCNC: 0.8 MG/DL (ref 0–1.2)
BUN BLDV-MCNC: 11 MG/DL (ref 6–20)
CALCIUM SERPL-MCNC: 9.8 MG/DL (ref 8.6–10.2)
CANNABINOID SCREEN URINE: NOT DETECTED
CHLORIDE BLD-SCNC: 95 MMOL/L (ref 98–107)
CO2: 26 MMOL/L (ref 22–29)
COCAINE METABOLITE SCREEN URINE: NOT DETECTED
CREAT SERPL-MCNC: 1 MG/DL (ref 0.7–1.2)
EKG ATRIAL RATE: 84 BPM
EKG P AXIS: 60 DEGREES
EKG P-R INTERVAL: 162 MS
EKG Q-T INTERVAL: 378 MS
EKG QRS DURATION: 94 MS
EKG QTC CALCULATION (BAZETT): 446 MS
EKG R AXIS: 91 DEGREES
EKG T AXIS: 36 DEGREES
EKG VENTRICULAR RATE: 84 BPM
EOSINOPHILS ABSOLUTE: 0.05 E9/L (ref 0.05–0.5)
EOSINOPHILS RELATIVE PERCENT: 1 % (ref 0–6)
ETHANOL: <10 MG/DL (ref 0–0.08)
FENTANYL SCREEN, URINE: NOT DETECTED
GFR AFRICAN AMERICAN: >60
GFR NON-AFRICAN AMERICAN: >60 ML/MIN/1.73
GLUCOSE BLD-MCNC: 85 MG/DL (ref 74–99)
HCT VFR BLD CALC: 45 % (ref 37–54)
HEMOGLOBIN: 16 G/DL (ref 12.5–16.5)
IMMATURE GRANULOCYTES #: 0.02 E9/L
IMMATURE GRANULOCYTES %: 0.4 % (ref 0–5)
LYMPHOCYTES ABSOLUTE: 1.16 E9/L (ref 1.5–4)
LYMPHOCYTES RELATIVE PERCENT: 23.6 % (ref 20–42)
Lab: NORMAL
MCH RBC QN AUTO: 29.6 PG (ref 26–35)
MCHC RBC AUTO-ENTMCNC: 35.6 % (ref 32–34.5)
MCV RBC AUTO: 83.2 FL (ref 80–99.9)
METHADONE SCREEN, URINE: NOT DETECTED
MONOCYTES ABSOLUTE: 0.45 E9/L (ref 0.1–0.95)
MONOCYTES RELATIVE PERCENT: 9.2 % (ref 2–12)
NEUTROPHILS ABSOLUTE: 3.18 E9/L (ref 1.8–7.3)
NEUTROPHILS RELATIVE PERCENT: 64.8 % (ref 43–80)
OPIATE SCREEN URINE: NOT DETECTED
OXYCODONE URINE: NOT DETECTED
PDW BLD-RTO: 12 FL (ref 11.5–15)
PHENCYCLIDINE SCREEN URINE: NOT DETECTED
PLATELET # BLD: 205 E9/L (ref 130–450)
PMV BLD AUTO: 11 FL (ref 7–12)
POTASSIUM SERPL-SCNC: 3.1 MMOL/L (ref 3.5–5)
RBC # BLD: 5.41 E12/L (ref 3.8–5.8)
SALICYLATE, SERUM: <0.3 MG/DL (ref 0–30)
SARS-COV-2, NAAT: NOT DETECTED
SODIUM BLD-SCNC: 136 MMOL/L (ref 132–146)
TOTAL PROTEIN: 6.9 G/DL (ref 6.4–8.3)
TRICYCLIC ANTIDEPRESSANTS SCREEN SERUM: NEGATIVE NG/ML
TROPONIN: <0.01 NG/ML (ref 0–0.03)
TSH SERPL DL<=0.05 MIU/L-ACNC: 1.13 UIU/ML (ref 0.27–4.2)
WBC # BLD: 4.9 E9/L (ref 4.5–11.5)

## 2021-04-26 PROCEDURE — 87635 SARS-COV-2 COVID-19 AMP PRB: CPT

## 2021-04-26 PROCEDURE — 80179 DRUG ASSAY SALICYLATE: CPT

## 2021-04-26 PROCEDURE — 85025 COMPLETE CBC W/AUTO DIFF WBC: CPT

## 2021-04-26 PROCEDURE — 80053 COMPREHEN METABOLIC PANEL: CPT

## 2021-04-26 PROCEDURE — 82077 ASSAY SPEC XCP UR&BREATH IA: CPT

## 2021-04-26 PROCEDURE — 99284 EMERGENCY DEPT VISIT MOD MDM: CPT

## 2021-04-26 PROCEDURE — 6370000000 HC RX 637 (ALT 250 FOR IP): Performed by: EMERGENCY MEDICINE

## 2021-04-26 PROCEDURE — 80307 DRUG TEST PRSMV CHEM ANLYZR: CPT

## 2021-04-26 PROCEDURE — 1240000000 HC EMOTIONAL WELLNESS R&B

## 2021-04-26 PROCEDURE — 80143 DRUG ASSAY ACETAMINOPHEN: CPT

## 2021-04-26 PROCEDURE — 93005 ELECTROCARDIOGRAM TRACING: CPT | Performed by: EMERGENCY MEDICINE

## 2021-04-26 PROCEDURE — 6370000000 HC RX 637 (ALT 250 FOR IP): Performed by: PSYCHIATRY & NEUROLOGY

## 2021-04-26 PROCEDURE — 84443 ASSAY THYROID STIM HORMONE: CPT

## 2021-04-26 PROCEDURE — 84484 ASSAY OF TROPONIN QUANT: CPT

## 2021-04-26 RX ORDER — POTASSIUM CHLORIDE 20 MEQ/1
40 TABLET, EXTENDED RELEASE ORAL ONCE
Status: COMPLETED | OUTPATIENT
Start: 2021-04-26 | End: 2021-04-26

## 2021-04-26 RX ORDER — HYDROXYZINE PAMOATE 50 MG/1
50 CAPSULE ORAL 3 TIMES DAILY PRN
Status: DISCONTINUED | OUTPATIENT
Start: 2021-04-26 | End: 2021-05-03 | Stop reason: HOSPADM

## 2021-04-26 RX ORDER — LORAZEPAM 0.5 MG/1
0.5 TABLET ORAL ONCE
Status: COMPLETED | OUTPATIENT
Start: 2021-04-26 | End: 2021-04-26

## 2021-04-26 RX ORDER — LORAZEPAM 1 MG/1
0.5 TABLET ORAL ONCE
Status: COMPLETED | OUTPATIENT
Start: 2021-04-26 | End: 2021-04-26

## 2021-04-26 RX ORDER — HALOPERIDOL 5 MG
5 TABLET ORAL EVERY 6 HOURS PRN
Status: DISCONTINUED | OUTPATIENT
Start: 2021-04-26 | End: 2021-05-03 | Stop reason: HOSPADM

## 2021-04-26 RX ORDER — HALOPERIDOL 5 MG/ML
5 INJECTION INTRAMUSCULAR EVERY 6 HOURS PRN
Status: DISCONTINUED | OUTPATIENT
Start: 2021-04-26 | End: 2021-05-03 | Stop reason: HOSPADM

## 2021-04-26 RX ORDER — MAGNESIUM HYDROXIDE/ALUMINUM HYDROXICE/SIMETHICONE 120; 1200; 1200 MG/30ML; MG/30ML; MG/30ML
30 SUSPENSION ORAL PRN
Status: DISCONTINUED | OUTPATIENT
Start: 2021-04-26 | End: 2021-05-03 | Stop reason: HOSPADM

## 2021-04-26 RX ORDER — ACETAMINOPHEN 325 MG/1
650 TABLET ORAL EVERY 6 HOURS PRN
Status: DISCONTINUED | OUTPATIENT
Start: 2021-04-26 | End: 2021-05-03 | Stop reason: HOSPADM

## 2021-04-26 RX ORDER — TRAZODONE HYDROCHLORIDE 50 MG/1
50 TABLET ORAL NIGHTLY PRN
Status: DISCONTINUED | OUTPATIENT
Start: 2021-04-26 | End: 2021-05-03 | Stop reason: HOSPADM

## 2021-04-26 RX ADMIN — HYDROXYZINE PAMOATE 50 MG: 50 CAPSULE ORAL at 17:19

## 2021-04-26 RX ADMIN — LORAZEPAM 0.5 MG: 1 TABLET ORAL at 13:04

## 2021-04-26 RX ADMIN — POTASSIUM CHLORIDE 40 MEQ: 1500 TABLET, EXTENDED RELEASE ORAL at 02:43

## 2021-04-26 RX ADMIN — LORAZEPAM 0.5 MG: 1 TABLET ORAL at 04:48

## 2021-04-26 RX ADMIN — LORAZEPAM 0.5 MG: 0.5 TABLET ORAL at 19:53

## 2021-04-26 ASSESSMENT — SLEEP AND FATIGUE QUESTIONNAIRES
RESTFUL SLEEP: NO
DO YOU HAVE DIFFICULTY SLEEPING: YES
DIFFICULTY FALLING ASLEEP: YES
AVERAGE NUMBER OF SLEEP HOURS: 6

## 2021-04-26 NOTE — ED NOTES
Patient will need Covid swab in order to be reviewed for admission. ED Physician aware.      JIM Meeks, [facility]  04/26/21 [id number]

## 2021-04-26 NOTE — PROGRESS NOTES
`[facility]  Admission Note   Pt presented to the ED with increased anxiety that has become so severe it is impacting his everyday life. Pt states anxiety is pretty bad and admits to using benadryl to help him calm down. Pt was not involved with any mental health treatment prior to this admission. Pt states that 2021 is the worst year for him so far and has been worse than 2020. Pt states he has been depressed sleeping for long periords of time, has lost interest in things that normally he enjoys, is unable to concentrate, has racing thoughts and can't remember things. Pt has had an appendectomy happen to him at work, work has been stressful, he has lost his routine, couldn't see his friends due to covid, and has had a relationship with his fiance that did not work out. Pt denies SI/HI & AVH. He identifies his support system as his family. ALLI signed for [place] and [employer]. Admission Type:   Admission Type: Involuntary    Reason for admission:  Reason for Admission: my mind is racing and my anxiety is high    PATIENT STRENGTHS:  Strengths: Communication, No significant Physical Illness, Employment, Positive Support, Social Skills    Patient Strengths and Limitations:  Limitations: Tendency to isolate self    Addictive Behavior:        Medical Problems:   History reviewed. No pertinent past medical history.     Status EXAM:  Status and Exam  Normal: No  Facial Expression: Worried  Affect: Appropriate  Level of Consciousness: Alert  Mood:Normal: No  Mood: Anxious, Depressed  Motor Activity:Normal: No  Motor Activity: Decreased  Interview Behavior: Cooperative  Preception: Parker to Person, Alvia [place]om[place] to Time, Parker to Place  Attention:Normal: No  Attention: Unable to Concentrate  Thought Processes: Circumstantial  Thought Content:Normal: No  Hallucinations: None  Delusions: No  Memory:Normal: No  Memory: Poor Recent  Insight and Judgment: Yes  Present Suicidal Ideation: No  Present Homicidal Ideation: No

## 2021-04-26 NOTE — ED PROVIDER NOTES
HPI:  4/26/21, Time: 12:35 AM EDT         Aidan Soto is a 31 y.o. male presenting to the ED for anxiety, beginning months ago. The complaint has been persistent, moderate in severity, and worsened by emotional upset. Patient reporting feeling anxious and unable to think having racing thoughts. Patient reports that he was at work and was having panic attack. Patient reporting recent break-up with fiancé. Patient also reports that he just recently moved in with his parents. Patient reporting episodes like this over the past year but worsening. Patient reporting no homicidal suicidal thoughts. Patient reporting no chest pain no difficulty breathing no abdominal pain no vomiting there is no diarrhea. There is no headache. There is no leg pain or swelling. There is no cough he has been self-medicating with Benadryl over the last several weeks. Patient also taking weight loss medication as well. ROS:   Pertinent positives and negatives are stated within HPI, all other systems reviewed and are negative.  --------------------------------------------- PAST HISTORY ---------------------------------------------  Past Medical History:  has no past medical history on file. Past Surgical History:  has a past surgical history that includes laparoscopic appendectomy (N/A, 1/3/2021). Social History:  reports that he has never smoked. He has never used smokeless tobacco. He reports previous alcohol use. He reports that he does not use drugs. Family History: family history is not on file. The patients home medications have been reviewed. Allergies: Patient has no known allergies.     ---------------------------------------------------PHYSICAL EXAM--------------------------------------    Constitutional/General: Alert and oriented x3, well appearing, non toxic in NAD  Head: Normocephalic and atraumatic  Eyes: PERRL, EOMI  Mouth: Oropharynx clear, handling secretions, no trismus  Neck: Supple, Comprehensive Metabolic Panel   Result Value Ref Range    Sodium 136 132 - 146 mmol/L    Potassium 3.1 (L) 3.5 - 5.0 mmol/L    Chloride 95 (L) 98 - 107 mmol/L    CO2 26 22 - 29 mmol/L    Anion Gap 15 7 - 16 mmol/L    Glucose 85 74 - 99 mg/dL    BUN 11 6 - 20 mg/dL    CREATININE 1.0 0.7 - 1.2 mg/dL    GFR Non-African American >60 >=60 mL/min/1.73    GFR African American >60     Calcium 9.8 8.6 - 10.2 mg/dL    Total Protein 6.9 6.4 - 8.3 g/dL    Albumin 5.2 3.5 - 5.2 g/dL    Total Bilirubin 0.8 0.0 - 1.2 mg/dL    Alkaline Phosphatase 50 40 - 129 U/L    ALT 23 0 - 40 U/L    AST 26 0 - 39 U/L   Troponin   Result Value Ref Range    Troponin <0.01 0.00 - 0.03 ng/mL   Urine Drug Screen   Result Value Ref Range    Amphetamine Screen, Urine NOT DETECTED Negative <1000 ng/mL    Barbiturate Screen, Ur NOT DETECTED Negative < 200 ng/mL    Benzodiazepine Screen, Urine NOT DETECTED Negative < 200 ng/mL    Cannabinoid Scrn, Ur NOT DETECTED Negative < 50ng/mL    Cocaine Metabolite Screen, Urine NOT DETECTED Negative < 300 ng/mL    Opiate Scrn, Ur NOT DETECTED Negative < 300ng/mL    PCP Screen, Urine NOT DETECTED Negative < 25 ng/mL    Methadone Screen, Urine NOT DETECTED Negative <300 ng/mL    Oxycodone Urine NOT DETECTED Negative <100 ng/mL    FENTANYL SCREEN, URINE NOT DETECTED Negative <1 ng/mL    Drug Screen Comment: see below    Serum Drug Screen   Result Value Ref Range    Ethanol Lvl <10 mg/dL    Acetaminophen Level <5.0 (L) 10.0 - 77.8 mcg/mL    Salicylate, Serum <8.5 0.0 - 30.0 mg/dL    TCA Scrn NEGATIVE Cutoff:300 ng/mL   TSH without Reflex   Result Value Ref Range    TSH 1.130 0.270 - 4.200 uIU/mL   EKG 12 Lead   Result Value Ref Range    Ventricular Rate 84 BPM    Atrial Rate 84 BPM    P-R Interval 162 ms    QRS Duration 94 ms    Q-T Interval 378 ms    QTc Calculation (Bazett) 446 ms    P Axis 60 degrees    R Axis 91 degrees    T Axis 36 degrees       RADIOLOGY:  Interpreted by Radiologist.  No orders to display EKG:  This EKG is signed and interpreted by me. Rate: 84  Rhythm: Sinus  Interpretation: no acute changes  Comparison: no previous EKG available        ------------------------- NURSING NOTES AND VITALS REVIEWED ---------------------------   The nursing notes within the ED encounter and vital signs as below have been reviewed by myself. BP (!) 156/96   Pulse 100   Temp 98 °F (36.7 °C) (Tympanic)   Ht 5' 8\" (1.727 m)   Wt 170 lb (77.1 kg)   SpO2 98%   BMI 25.85 kg/m²   Oxygen Saturation Interpretation: Normal    The patients available past medical records and past encounters were reviewed. ------------------------------ ED COURSE/MEDICAL DECISION MAKING----------------------  Medications   potassium chloride (KLOR-CON M) extended release tablet 40 mEq (40 mEq Oral Given 4/26/21 6483)   LORazepam (ATIVAN) tablet 0.5 mg (0.5 mg Oral Given 4/26/21 7098)             Medical Decision Making:      Presenting here because of feeling anxious and having panic attacks. Patient reporting no homicidal suicidal thoughts. Patient though is unable to cope with family life as well as recent break-up. Patient labs noted reviewed. Patient did request medication for anxiety here. Patient has been self-medicating with Benadryl. Patient made aware lab results and plan for  to evaluate  Re-Evaluations:             Re-evaluation. Patients symptoms show no change  Patient rechecked and unchanged. Patient made aware lab results and plan    Consultations:                 Critical Care: This patient's ED course included: a personal history and physicial eaxmination    This patient has been closely monitored during their ED course. Counseling: The emergency provider has spoken with the patient and discussed todays results, in addition to providing specific details for the plan of care and counseling regarding the diagnosis and prognosis.   Questions are answered at this time and they are agreeable with the plan.       --------------------------------- IMPRESSION AND DISPOSITION ---------------------------------    IMPRESSION  1. Anxiety        DISPOSITION  Disposition:  worker to assist with disposition  Patient condition is stable        NOTE: This report was transcribed using voice recognition software.  Every effort was made to ensure accuracy; however, inadvertent computerized transcription errors may be present          Ines Hoffman MD  04/26/21 0077           Ines Hoffman MD  04/26/21 9091

## 2021-04-26 NOTE — ED NOTES
Pt voiced being anxious requesting something to relax states \"mind racing\" JOSE lloyd.      Merry Mijares, LPN  33/34/16 6464

## 2021-04-26 NOTE — PLAN OF CARE
Problem: Altered Mood, Depressive Behavior:  Goal: Able to verbalize acceptance of life and situations over which he or she has no control  Description: Able to verbalize acceptance of life and situations over which he or she has no control  Outcome: Ongoing  Goal: Able to verbalize and/or display a decrease in depressive symptoms  Description: Able to verbalize and/or display a decrease in depressive symptoms  Outcome: Ongoing  Goal: Able to verbalize support systems  Description: Able to verbalize support systems  Outcome: Met This Shift

## 2021-04-26 NOTE — ED NOTES
Emergency Department [facility] OF [facility] Biopsychosocial Assessment Note    Chief Complaint:     Patient presents to the ED for anxiety. Reports feeling anxious and having racing thoughts. MSE:    Pt presents as a 31year old male. He is alert and oriented x4. Pt is cooperative, markedly anxious. Speech is monotone and low in volume. Appears to be experiencing thought blocking. Reports racing thoughts that are constant, unable to be stopped. Flat affect. Denies SI, HI or AVH. Denies illicit drug use. Clinical Summary/History:     Pt reports that he has been experiencing extreme anxiety \"with no release. \" Explains that he has been attempting to utilize his typical coping skills (running, working out) but nothing helps. He states he has been having racing thoughts \"and everything is just piling up like in the back of my head. \" He states \"functioning is really tough anymore. \" He further explains this by stating he felt like he could barely drive to get to work tonight, feels confused regularly like he can not understand/process what others are saying, has difficulty completing typically mundane tasks ie filling out a questionnaire.) He relays \"my roles are all mixed up. \"    States he has been experiencing paranoia regularly. Feels like \"everything is a set up. \" Additionally reports \"I've been behaving bizarre\" explains this as: he is not at all himself, others around him have noticed. Pt states he spent thousands of dollars on items he does not need. Pt expresses that his mental health has been suffering for about a year. He reports that he hasn't wanted to take psychiatric medication. Pt explains he attempted to begin the process of engaging in counseling, but could not decide which mental health provider he should contact. Denies SI and HI. When asked about AVH pt gives a long pause, looks around the room, and quietly responds no. Self-medicating with benadryl.  Pt reports that last weekend, he took benadryl repeatedly so he could just sleep. He states he would take benadryl, sleep, wake for maybe a few hours then take more benadryl. He states that he is also currently taking diet pills, as for a period of time he was eating anytime he was anxious and gained weight as a result. He discusses having taken the benadryl and diet pills at the same time and relays \"as a medical professional, I'm ashamed. \"    Gender  [x] Male [] Female [] Transgender  [] Other    Sexual Orientation    [x] Heterosexual [] Homosexual [] Bisexual [] Other    Suicidal Behavioral: CSSR-S Complete. [] Reports:    [] Past [] Present   [x] Denies    Homicidal/ Violent Behavior  [] Reports:   [] Past [] Present   [x] Denies     Hallucinations/Delusions   [x] Reports:   [] Denies     Substance Use/Alcohol Use/Addiction: SBIRT Screen Complete. [] Reports:   [x] Denies     Trauma History  [x] Reports:  [] Denies     Collateral Information:   No collateral obtained at this time.     Level of Care/Disposition Plan  [] Home:   [] Outpatient Provider:   [] Crisis Unit:   [x] Inpatient Psychiatric Unit:  [] Other:        JIM Phillips, KINGS  04/26/21 8813

## 2021-04-26 NOTE — PROGRESS NOTES
Patient up to the nurses station complaining of increased anxiety and stating that \"I feel like I'm going to have a panic attack. \"  Spoke with Dr. Ramona Piña regarding patient request of a one time dose of Ativan. See new orders.

## 2021-04-26 NOTE — ED NOTES
ASSIGNED [id number] TO [facility] [place] [address] [place] [address], \[facility] [place] [facility]\""  04/26/21 9158

## 2021-04-27 PROBLEM — F33.2 SEVERE EPISODE OF RECURRENT MAJOR DEPRESSIVE DISORDER, WITHOUT PSYCHOTIC FEATURES (HCC): Status: ACTIVE | Noted: 2021-04-27

## 2021-04-27 PROBLEM — F41.0 PANIC DISORDER WITHOUT AGORAPHOBIA: Status: ACTIVE | Noted: 2021-04-27

## 2021-04-27 PROBLEM — F32.9 MAJOR DEPRESSION, SINGLE EPISODE: Status: RESOLVED | Noted: 2021-04-26 | Resolved: 2021-04-27

## 2021-04-27 PROCEDURE — 99222 1ST HOSP IP/OBS MODERATE 55: CPT | Performed by: NURSE PRACTITIONER

## 2021-04-27 PROCEDURE — 6370000000 HC RX 637 (ALT 250 FOR IP): Performed by: NURSE PRACTITIONER

## 2021-04-27 PROCEDURE — 6370000000 HC RX 637 (ALT 250 FOR IP): Performed by: PSYCHIATRY & NEUROLOGY

## 2021-04-27 PROCEDURE — 1240000000 HC EMOTIONAL WELLNESS R&B

## 2021-04-27 RX ORDER — VENLAFAXINE HYDROCHLORIDE 37.5 MG/1
37.5 CAPSULE, EXTENDED RELEASE ORAL
Status: DISCONTINUED | OUTPATIENT
Start: 2021-04-27 | End: 2021-04-29 | Stop reason: DRUGHIGH

## 2021-04-27 RX ADMIN — TRAZODONE HYDROCHLORIDE 50 MG: 50 TABLET ORAL at 20:59

## 2021-04-27 RX ADMIN — HYDROXYZINE PAMOATE 50 MG: 50 CAPSULE ORAL at 11:24

## 2021-04-27 RX ADMIN — HYDROXYZINE PAMOATE 50 MG: 50 CAPSULE ORAL at 04:40

## 2021-04-27 RX ADMIN — VENLAFAXINE HYDROCHLORIDE 37.5 MG: 37.5 CAPSULE, EXTENDED RELEASE ORAL at 14:21

## 2021-04-27 RX ADMIN — HYDROXYZINE PAMOATE 50 MG: 50 CAPSULE ORAL at 18:19

## 2021-04-27 ASSESSMENT — LIFESTYLE VARIABLES: HISTORY_ALCOHOL_USE: NO

## 2021-04-27 ASSESSMENT — PAIN SCALES - GENERAL: PAINLEVEL_OUTOF10: 0

## 2021-04-27 ASSESSMENT — SLEEP AND FATIGUE QUESTIONNAIRES
AVERAGE NUMBER OF SLEEP HOURS: 6
DIFFICULTY STAYING ASLEEP: YES
DO YOU USE A SLEEP AID: YES
DIFFICULTY ARISING: NO

## 2021-04-27 NOTE — PROGRESS NOTES
Group Therapy Note    Patient attended goals group and stated daily goal as to try to finish my word search but I am so anxious. Group Therapy Note    Date: 4/27/2021  Start Time: 10:00  End Time:  10:30  Number of Participants:13    Type of Group: Psychoeducation    Wellness Binder Information  Module Name: Self-esteem  Session Number:  NA    Patient's Goal:  To id positive affirmations to improve self-esteem. Notes: Attended group and was able to participate through listening but was standing up through group. Anxious, unable to relax in group. Status After Intervention:  Unchanged    Participation Level:  Active Listener, Interactive and Minimal    Participation Quality: Attentive      Speech:  hesitant      Thought Process/Content: Linear      Affective Functioning: Blunted and Flat      Mood: anxious and depressed      Level of consciousness:  Preoccupied      Response to Learning: Progressing to goal      Endings: None Reported    Modes of Intervention: Education      Discipline Responsible: Psychoeducational Specialist      Signature:  KINGS Young

## 2021-04-27 NOTE — PLAN OF CARE
Problem: Altered Mood, Depressive Behavior:  Goal: Able to verbalize and/or display a decrease in depressive symptoms  Description: Able to verbalize and/or display a decrease in depressive symptoms  4/27/2021 1135 by Chula Kaur RN  Outcome: Ongoing     Problem: Altered Mood, Depressive Behavior:  Goal: Ability to disclose and discuss suicidal ideas will improve  Description: Ability to disclose and discuss suicidal ideas will improve  Outcome: Met This Shift     Problem: Altered Mood, Depressive Behavior:  Goal: Able to verbalize support systems  Description: Able to verbalize support systems  Outcome: Met This Shift     Problem: Altered Mood, Depressive Behavior:  Goal: Absence of self-harm  Description: Absence of self-harm  Outcome: Met This Shift     Pt is isolative to room. Pt offered minimal conversation with assessment. Pt denies SI HI and AVH and rates anxiety 7/10. Pt comes out on unit for meals and attended part of morning group. Pt taking medication with no issues.  Will continue to monitor

## 2021-04-27 NOTE — H&P
Department of Psychiatry  History and Physical - Adult     CHIEF COMPLAINT: Anxiety, racing thoughts, thought blocking, paranoia    Patient was seen after discussing with the treatment team and reviewing the chart    CIRCUMSTANCES OF ADMISSION:   Patient presented to [facility] emergency department for anxiety, racing thoughts, paranoia stating that his mental health has been suffering for about a year, and reporting that he has not wanted to take psychiatric medications. HISTORY OF PRESENT ILLNESS:      The patient is a 31 y.o. male with no documented significant past history of mental illness or physical illness, presented to [facility] emergency department for anxiety racing thoughts paranoia endorsing that his mental health has been suffering for about a year and reporting that he has not been wanting to take any psychiatric medications. Toxicology negative, . He was medically cleared and admitted to [facility] for psychiatric evaluation and stabilization. Upon assessment today, the patient appears guarded and evasive, he is superficially forthcoming and cooperative for assessment. He has a long delay in his processing. He has difficulty answering questions with relevance. He is unable to abstract. His answers are concrete. And requires questions to be present in multiple different ways before he understands what is being asked. His responses are vague and evasive, often talking in circles and never answering the question asked. He appears to be thought blocking. He is somewhat suspicious. Assessment is limited based on patient's ability to participate in assessment. He is easily overwhelmed. He denies suicidal or homicidal ideation intent or plan. He neither denies nor endorses auditory or visual hallucinations, however provides vague responses that never answer the question. He does appear somewhat paranoid. He seems to be ruminating about what others think of him.   He states that he has never had mental health treatment before but was pursuing mental health treatment with a plan to work through the weekend and then call for an appointment on Monday. He states that he is always had some depression and anxiety. Including during childhood. During assessment today the patient is easily distracted, and preoccupied he seems to be having a great deal of difficulty processing questions being asked and his environment. He appears very overwhelmed and he looks visibly distressed, he cannot tell me the last time that he felt better did not feel depressed. He states that he has some body image issues and has been taking dietary supplements and weight loss medications. He states that since he works flex shifts he does not get good sleep he feels that this has been severely affecting him. Assessment is very limited due to patient's impaired concentration, distractibility and delayed processing. Past psychiatric history:  States that he has always had depression however does not endorse any mental health treatment. Substance abuse history:   Denies    Family mental health history:  Denies    Legal history:  Denies    Personal family social history:  Patient states that he was raised by his parents in [address] [place] [address] he has 2 sisters. 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 States that he is currently living with his parents, he has no kids he is currently single, and has never been . Mental Status Examination:    Mental status examination reveals a 26-year-old  male with good hygiene and good grooming who appears his stated age in hospital attire is superficially forthcoming and cooperative for assessment despite his psychiatric condition. Psychomotor reveals some retardation and slower movements there is no agitation, involuntary movements or abnormal posturing.   Speech is slow, there is a long latency of response he appears to be thought blocking he has difficulty answering questions with relevance. Eye contact is evasive. Mood is \"anxious. \"  Affect is distracted, anxious, preoccupied, suspicious congruent with stated mood. Thought process is concrete with loose associations. Thought content is devoid of auditory or visual hallucinations however the patient does appear somewhat paranoid and suspicious. Devoid of suicidal or homicidal ideation intent or plan. Memory is intact through conversation. Cognitive function appears to be below baseline due to illness. Insight judgment and impulse control are poor. He is alert and oriented to person place time and situation and able to recount events leading to his hospitalization. Past Medical History:    History reviewed. No pertinent past medical history. Medications Prior to Admission:   No medications prior to admission. Past Surgical History:        Procedure Laterality Date    LAPAROSCOPIC APPENDECTOMY N/A 1/3/2021    APPENDECTOMY LAPAROSCOPIC performed by Eddie Medrano MD at [facility]:   Patient has no known allergies. Family History  History reviewed. No pertinent family history. EXAMINATION:    REVIEW OF SYSTEMS:    ROS:  [x] All negative/unchanged except if checked.  Explain positive(checked items) below:  [] Constitutional  [] Eyes  [] Ear/Nose/Mouth/Throat  [] Respiratory  [] CV  [] GI  []   [] Musculoskeletal  [] Skin/Breast  [] Neurological  [] Endocrine  [] Heme/Lymph  [] Allergic/Immunologic    Explanation:     Vitals:  BP (!) 141/70   Pulse 89   Temp 99.4 °F (37.4 °C) (Temporal)   Resp 16   Ht 5' 8\" (1.727 m)   Wt 170 lb (77.1 kg)   SpO2 95%   BMI 25.85 kg/m²      Physical Examination:   Head: x  Atraumatic: x normocephalic  Skin and Mucosa        Moist x  Dry   Pale  x Normal   Neck:  Thyroid  Palpable   x  Not palpable   venus distention   adenopathy   Chest: x Clear   Rhonchi     Wheezing diagnosis and assessment biopsychosocial treatment model was presented to the patient and was given the opportunity to ask any question. The patient was agreeable to the plan and all the patient's questions were answered to the patient's satisfaction. I discussed with the patient the risk, benefit, alternative and common side effects for the proposed medication treatment. The patient is consenting to this treatment. Collateral Information:  Will obtain collateral information from the family or friends. Will obtain medical records as appropriate from out patient providers  Will consult the hospitalist for a physical exam to rule out any co-morbid physical condition. Home medication Reconciled       New Medications started during this admission :    Effexor XR 37.5 mg for depression and anxiety  We will consider the addition of an antipsychotic    Prn Haldol 5mg and Vistaril 50mg q6hr for extreme agitation. Trazodone as ordered for insomnia  Vistaril as ordered for anxiety      Psychotherapy:   Encourage participation in milieu and group therapy  Individual therapy as needed              Behavioral Services  Medicare Certification Upon Admission    I certify that this patient's inpatient psychiatric hospital admission is medically necessary for:    [x] (1) Treatment which could reasonably be expected to improve this patient's condition,       [x] (2) Or for diagnostic study;     AND     [x](2) The inpatient psychiatric services are provided while the individual is under the care of a physician and are included in the individualized plan of care.     Estimated length of stay/service 3 to 5 days based on stability  Plan for post-hospital care follow-up with outpatient provider  Electronically signed by RADHA Powell CNP on 4/27/2021 at 8:35 AM

## 2021-04-27 NOTE — PLAN OF CARE
Patient denies SI/HI/AVH. Patient states his anxiety is 10/10, PRN medication given, see MAR. Patient appears worried and anxious.      Problem: Altered Mood, Depressive Behavior:  Goal: Able to verbalize acceptance of life and situations over which he or she has no control  Description: Able to verbalize acceptance of life and situations over which he or she has no control  4/26/2021 2306 by Romario Barker, RN  Outcome: Ongoing     Problem: Altered Mood, Depressive Behavior:  Goal: Able to verbalize and/or display a decrease in depressive symptoms  Description: Able to verbalize and/or display a decrease in depressive symptoms  4/26/2021 2306 by Romario Barker, RN  Outcome: Ongoing     Problem: Altered Mood, Depressive Behavior:  Goal: Participates in care planning  Description: Participates in care planning  Outcome: Ongoing

## 2021-04-27 NOTE — CARE COORDINATION
Biopsychosocial Assessment Note    Social work met with patient to complete the biopsychosocial assessment and CSSR-S. Mental Status Exam: Pt alert and oriented x 4. Pt was friendly and cooperative throughout assessment. Pt's thought process appeared disorganized, speech was clear. Chief Complaint: \"Patient presents to the ED for anxiety. Reports feeling anxious and having racing thoughts. \"    Patient Report: Pt states that this is his first admission to a psychiatric facility. Pt states that his thoughts have been too much for him to handle recently, and he decided to seek help. Pt denied that his thoughts are racing, but states that his thoughts make it hard for him to concentrate. So much so, that others around him notice. Pt denied lifetime suicide attempts. Pt currently denying SI/ HI/ hallucinations/ delusions. Pt denied substance use. Pt denied trauma history. Gender  [x] Male [] Female [] Transgender  [] Other    Sexual Orientation    [x] Heterosexual [] Homosexual [] Bisexual [] Other    Suicidal Ideation  [] Past [] Present [x] Denies     Homicidal Ideation  [] Past [] Present [x] Denies     Hallucinations/Delusions (Specify type)  [] Reports [x] Denies     Substance Use/Alcohol Use/Addiction  [] Reports [x] Denies     Tobacco Use (within the last 6 months)  [] Reports [x] Denies     Trauma History  [] Reports [x] Denies     Collateral Contact (ALLI signed)  Name: Laury Carias and [address] [place] [address] as well as mother  Relationship: sister and parents  Number: [phone] and [phone]    Collateral Information: Sw spoke with pt's father, Daquan. Daquan states that he has no concerns for this pt, and just hopes he's well when he's discharged. Pt is able to return to the home with his parents at the time of discharge. No access to weapons per dad.        Access to Weapons per Collateral Contact: [] Reports [x] Denies       Follow up provider preference: Not currently active      Plan for discharge

## 2021-04-28 PROCEDURE — 1240000000 HC EMOTIONAL WELLNESS R&B

## 2021-04-28 PROCEDURE — 99231 SBSQ HOSP IP/OBS SF/LOW 25: CPT | Performed by: NURSE PRACTITIONER

## 2021-04-28 PROCEDURE — 6370000000 HC RX 637 (ALT 250 FOR IP): Performed by: NURSE PRACTITIONER

## 2021-04-28 PROCEDURE — 6370000000 HC RX 637 (ALT 250 FOR IP): Performed by: PSYCHIATRY & NEUROLOGY

## 2021-04-28 RX ORDER — LANOLIN ALCOHOL/MO/W.PET/CERES
6 CREAM (GRAM) TOPICAL DAILY
Status: DISCONTINUED | OUTPATIENT
Start: 2021-04-28 | End: 2021-05-03 | Stop reason: HOSPADM

## 2021-04-28 RX ADMIN — HYDROXYZINE PAMOATE 50 MG: 50 CAPSULE ORAL at 12:52

## 2021-04-28 RX ADMIN — HYDROXYZINE PAMOATE 50 MG: 50 CAPSULE ORAL at 06:35

## 2021-04-28 RX ADMIN — Medication 6 MG: at 18:27

## 2021-04-28 RX ADMIN — VENLAFAXINE HYDROCHLORIDE 37.5 MG: 37.5 CAPSULE, EXTENDED RELEASE ORAL at 10:12

## 2021-04-28 ASSESSMENT — PAIN SCALES - GENERAL: PAINLEVEL_OUTOF10: 0

## 2021-04-28 NOTE — PLAN OF CARE
Problem: Altered Mood, Depressive Behavior:  Goal: Able to verbalize and/or display a decrease in depressive symptoms  Description: Able to verbalize and/or display a decrease in depressive symptoms  4/28/2021 1050 by Chidi Alonzo RN  Outcome: Ongoing     Problem: Altered Mood, Depressive Behavior:  Goal: Ability to disclose and discuss suicidal ideas will improve  Description: Ability to disclose and discuss suicidal ideas will improve  Outcome: Met This Shift     Problem: Altered Mood, Depressive Behavior:  Goal: Able to verbalize support systems  Description: Able to verbalize support systems  4/28/2021 1050 by hCidi Alonzo RN  Outcome: Met This Shift     Problem: Altered Mood, Depressive Behavior:  Goal: Absence of self-harm  Description: Absence of self-harm  4/28/2021 1050 by Chidi Alonzo RN  Outcome: Met This Shift     Pt out on unit but keeps to self. Pt is using exercise equipment and pacing halls. Pt is much improved today and states his thoughts are not racing likr yesterday and he is able to concentrate better today. He states he feels the effexor is working. Pt denies SI HI and AVH. Pt rates anxiety and depression 5/10. Pt is taking prescribed medication and eating provided meals. Will continue to monitor.

## 2021-04-28 NOTE — GROUP NOTE
Group Therapy Note     Date: 4/28/2021     Group Start Time: 1415  Group End Time: 6389  Group Topic: Cognitive Skills     [facility] 1    ROHAN Mendoza           Group Therapy Note     Attendees: 14           Patient's Goal:  Pt will participate in discussion of anger management skills.     Notes:  Pt was an active participant in class discussion.      Status After Intervention:  Improved     Participation Level:  Active Listener and Interactive     Participation Quality: Appropriate, Attentive, Sharing and Supportive        Speech:  normal        Thought Process/Content: Logical        Affective Functioning: Congruent        Mood: depressed        Level of consciousness:  Alert, Oriented x4 and Attentive        Response to Learning: Able to verbalize current knowledge/experience, Able to verbalize/acknowledge new learning and Progressing to goal        Endings: None Reported     Modes of Intervention: Education, Support, Socialization and Problem-solving        Discipline Responsible: /Counselor        Signature:  Donnamae Cogan, LISW-S

## 2021-04-28 NOTE — PLAN OF CARE
Patient denies SI/HI/AVH. Patient states his anxiety is 6/10. Patient appears anxious and worried. Patient reports he would like to go to outpatient counseling with one of his family members. Patient is sitting quietly in [place] [address].      Problem: Altered Mood, Depressive Behavior:  Goal: Able to verbalize support systems  Description: Able to verbalize support systems  4/27/2021 2147 by Orland Bumpers, RN  Outcome: Met This Shift     Problem: Altered Mood, Depressive Behavior:  Goal: Able to verbalize acceptance of life and situations over which he or she has no control  Description: Able to verbalize acceptance of life and situations over which he or she has no control  Outcome: Ongoing     Problem: Altered Mood, Depressive Behavior:  Goal: Able to verbalize and/or display a decrease in depressive symptoms  Description: Able to verbalize and/or display a decrease in depressive symptoms  4/27/2021 2147 by Orland Bumpers, RN  Outcome: Ongoing

## 2021-04-29 PROCEDURE — 6370000000 HC RX 637 (ALT 250 FOR IP): Performed by: PSYCHIATRY & NEUROLOGY

## 2021-04-29 PROCEDURE — 1240000000 HC EMOTIONAL WELLNESS R&B

## 2021-04-29 PROCEDURE — 99231 SBSQ HOSP IP/OBS SF/LOW 25: CPT | Performed by: NURSE PRACTITIONER

## 2021-04-29 PROCEDURE — 6370000000 HC RX 637 (ALT 250 FOR IP): Performed by: NURSE PRACTITIONER

## 2021-04-29 RX ORDER — VENLAFAXINE 75 MG/1
37.5 TABLET ORAL ONCE
Status: COMPLETED | OUTPATIENT
Start: 2021-04-29 | End: 2021-04-29

## 2021-04-29 RX ORDER — VENLAFAXINE HYDROCHLORIDE 75 MG/1
75 CAPSULE, EXTENDED RELEASE ORAL
Status: DISCONTINUED | OUTPATIENT
Start: 2021-04-30 | End: 2021-04-30

## 2021-04-29 RX ADMIN — TRAZODONE HYDROCHLORIDE 50 MG: 50 TABLET ORAL at 01:25

## 2021-04-29 RX ADMIN — HYDROXYZINE PAMOATE 50 MG: 50 CAPSULE ORAL at 21:59

## 2021-04-29 RX ADMIN — HYDROXYZINE PAMOATE 50 MG: 50 CAPSULE ORAL at 10:45

## 2021-04-29 RX ADMIN — HYDROXYZINE PAMOATE 50 MG: 50 CAPSULE ORAL at 00:23

## 2021-04-29 RX ADMIN — VENLAFAXINE 37.5 MG: 75 TABLET ORAL at 11:23

## 2021-04-29 RX ADMIN — VENLAFAXINE HYDROCHLORIDE 37.5 MG: 37.5 CAPSULE, EXTENDED RELEASE ORAL at 09:22

## 2021-04-29 RX ADMIN — Medication 6 MG: at 17:29

## 2021-04-29 RX ADMIN — TRAZODONE HYDROCHLORIDE 50 MG: 50 TABLET ORAL at 21:59

## 2021-04-29 ASSESSMENT — PAIN SCALES - GENERAL: PAINLEVEL_OUTOF10: 0

## 2021-04-29 NOTE — PROGRESS NOTES
BEHAVIORAL HEALTH FOLLOW-UP NOTE     4/29/2021     Patient was seen and examined in person, Chart reviewed   Patient's case discussed with staff/team    Chief Complaint: \"I did not like the trazodone. \"  Interim History:     Patient is observed up on the unit, he is less anxious than initial encounter. However he remains guarded and paranoid. He is very watchful while up on the unit, he remains anxious. During conversation his thoughts appear to be more linear, there is a decrease in latency of response but not as severe as yesterday. Processing seems to be improving, however he appears to have difficulty understanding. He states he tolerated his first dose of Effexor XR well. He does remain somewhat confused, and is not at his baseline. We will increase Effexor XR to 75 mg daily to see if his cognitive processing clears further with an increase in dosage. He is compliant with his medications, however very cautious almost suspicious of medications. He is attending groups. Does not appear paranoid as he did yesterday however this is still present. While on the unit yesterday he was very watchful and paranoid pacing the unit. Staff reports that he slept for 7 hours. Today he is endorsing a decrease in racing thoughts, he is less isolative and less anxious. Denies suicidal or homicidal ideation intent or plan. Denies AVH. We will monitor patient for improvement on Effexor XR 75 mg before addition of additional medications to assist in reducing anxiety, paranoia and cognitive blunting. Appetite:  [] Normal/Unchanged  [] Increased  [x] Decreased      Sleep:       [] Normal/Unchanged  [x] Fair       [] Poor              Energy:    [] Normal/Unchanged  [] Increased  [x] Decreased        SI [] Present  [x] Absent    HI  []Present  [x] Absent     Aggression:  [] yes  [x] no    Patient is [x] able  [] unable to CONTRACT FOR SAFETY     PAST MEDICAL/PSYCHIATRIC HISTORY:   History reviewed.  No pertinent Brittany Walker, APRN - CNP    melatonin tablet 6 mg, 6 mg, Oral, Daily, Florenciadharmesh Alberto, APRN - CNP, 6 mg at 04/28/21 1827    acetaminophen (TYLENOL) tablet 650 mg, 650 mg, Oral, Q6H PRN, Valarie Alexandre MD    magnesium hydroxide (MILK OF MAGNESIA) 400 MG/5ML suspension 30 mL, 30 mL, Oral, Daily PRN, Valarie Alexandre MD    aluminum & magnesium hydroxide-simethicone (MAALOX) 200-200-20 MG/5ML suspension 30 mL, 30 mL, Oral, PRN, Valarie Alexandre MD    hydrOXYzine (VISTARIL) capsule 50 mg, 50 mg, Oral, TID PRN, Valarie Alexandre MD, 50 mg at 04/29/21 1045    haloperidol lactate (HALDOL) injection 5 mg, 5 mg, Intramuscular, Q6H PRN **OR** haloperidol (HALDOL) tablet 5 mg, 5 mg, Oral, Q6H PRN, Valarie Alexandre MD    traZODone (DESYREL) tablet 50 mg, 50 mg, Oral, Nightly PRN, Valarie Alexandre MD, 50 mg at 04/29/21 0125      Examination:  /72   Pulse 85   Temp 97.8 °F (36.6 °C) (Temporal)   Resp 16   Ht 5' 8\" (1.727 m)   Wt 170 lb (77.1 kg)   SpO2 98%   BMI 25.85 kg/m²   Gait - steady  Medication side effects(SE): Denies    Mental Status Examination:    Level of consciousness:  within normal limits   Appearance:  good grooming and good hygiene  Behavior/Motor:  no abnormalities noted  Attitude toward examiner:  cooperative and attentive  Speech:  slow and increased latency   Mood: constricted and depressed  Affect:  mood congruent and blunted  Thought processes:  linear   Thought content: The patient is devoid of suicidal or homicidal ideation intent or plan. Devoid of auditory or visual hallucinations or other perceptual disturbances, there are no overt or covert signs of psychosis or paranoia. There are no neurovegetative signs of depression.   Cognition:  oriented to person, place, and time   Concentration intact  Insight improving   judgement improving    ASSESSMENT:   Patient symptoms are:  [] Well controlled  [x] Improving  [] Worsening  [] No change      Diagnosis:   Principal Problem:    Severe episode of recurrent major depressive disorder, without psychotic features ([facility] [address]  Active Problems:    Panic disorder without agoraphobia  Resolved Problems:    * No resolved hospital problems. *      LABS:    No results for input(s): WBC, HGB, PLT in the last 72 hours. No results for input(s): NA, K, CL, CO2, BUN, CREATININE, GLUCOSE in the last 72 hours. No results for input(s): BILITOT, ALKPHOS, AST, ALT in the last 72 hours. Lab Results   Component Value Date    LABAMPH NOT DETECTED 04/26/2021    BARBSCNU NOT DETECTED 04/26/2021    LABBENZ NOT DETECTED 04/26/2021    LABMETH NOT DETECTED 04/26/2021    OPIATESCREENURINE NOT DETECTED 04/26/2021    PHENCYCLIDINESCREENURINE NOT DETECTED 04/26/2021    ETOH <10 04/26/2021     Lab Results   Component Value Date    TSH 1.130 04/26/2021     No results found for: LITHIUM  No results found for: VALPROATE, CBMZ        Treatment Plan:  The patient's diagnosis, treatment plan, medication management were formulated after patient was seen directly by the attending physician and myself and all relevant documentation was reviewed. Reviewed current Medications with the patient. Risk, benefit, side effects, possible outcomes of the medication and alternatives discussed with the patient and the patient demonstrated understanding. The patient was also educated that the outcome of treatment will depend on the medication compliance as directed by the prescribers along with regular follow-up, compliance with the labs and other work-up, as clinically indicated. Effexor XR [place] 5 mg daily for depression and anxiety  Melatonin 6 mg daily at 1800 to reset sleep-wake cycle    Collateral information: Followed by social work   CD evaluation  Encourage patient to attend group and other milieu activities.   Discharge planning discussed with the patient and treatment team.    PSYCHOTHERAPY/COUNSELING:  [x] Therapeutic interview  [x] Supportive  [] CBT  [] Ongoing  [] Other    [x] Patient continues to need, on a daily basis, active treatment furnished directly by or requiring the supervision of inpatient psychiatric personnel      Anticipated Length of stay: 3 to 5 days based on stability            Electronically signed by RADHA Kramer CNP on 4/29/2021 at 2:07 PM

## 2021-04-29 NOTE — GROUP NOTE
Group Therapy Note    Date: 4/29/2021    Group Start Time: 1010  Group End Time: 1147  Group Topic: Psychoeducation    [facility] AC[facility]        Group Therapy Note    Attendees: 10         Patient's Goal:Finish my crossword puzzle. Get an return to work excuse. Notes: Active and engaged during discussion on problem solving. Able to share experiences with peers related to F.A.C.E. acronym. Status After Intervention:  Improved    Participation Level:  Active Listener and Interactive    Participation Quality: Appropriate, Attentive and Sharing      Speech:  normal      Thought Process/Content: Logical      Affective Functioning: Congruent      Mood: anxious      Level of consciousness:  Alert and Attentive      Response to Learning: Capable of insight, Able to change behavior and Progressing to goal      Endings: None Reported    Modes of Intervention: Education, Support, Socialization and Exploration      Discipline Responsible: Psychoeducational Specialist      Signature:  Lanie Pop, [address]

## 2021-04-29 NOTE — CARE COORDINATION
Saira spoke with pt's father Daquan. Daquan requesting that he speak specifically to the doctor. Chip also requesting a copy of this pt's pink, NP's time with this pt, and an answer to why we are increasing the medication. Saira reached out to [place] NP and reached VM. Saira supervisor Britany lloyd.

## 2021-04-29 NOTE — PLAN OF CARE
[facility]  Day 3 Interdisciplinary Treatment Plan NOTE    Review Date & Time: 4/29/2021 0930    Patient was in treatment team    Admission Type:   Admission Type: Involuntary    Reason for admission:  Reason for Admission: my mind is racing and my anxiety is high  Estimated Length of Stay Update:  3-5 days  Estimated Discharge Date Update: 5/3/2021    PATIENT STRENGTHS:  Patient Strengths Strengths: Communication, No significant Physical Illness, Motivated, Employment  Patient Strengths and Limitations:Limitations: Difficulty problem solving/relies on others to help solve problems, Tendency to isolate self  Addictive Behavior:Addictive Behavior  In the past 3 months, have you felt or has someone told you that you have a problem with:  : None  Do you have a history of Chemical Use?: No  Do you have a history of Alcohol Use?: No  Do you have a history of Street Drug Abuse?: No  Histroy of Prescripton Drug Abuse?: No  Medical Problems:History reviewed. No pertinent past medical history.     Risk:  Fall RiskTotal: 77  Rogelio Scale Rogelio Scale Score: 22  BVC Total: 0  Change in scores No. Changes to plan of Care No.    Status EXAM:   Status and Exam  Normal: No  Facial Expression: Flat, Sad, Worried  Affect: Appropriate, Congruent  Level of Consciousness: Alert  Mood:Normal: No  Mood: Depressed, Anxious, Sad  Motor Activity:Normal: Yes  Motor Activity: Increased  Interview Behavior: Cooperative  Preception: [place] to Person, [place] to Time, [place] to Place, [place] to Situation  Attention:Normal: Yes  Attention: Distractible  Thought Processes: Circumstantial  Thought Content:Normal: No  Thought Content: Preoccupations  Hallucinations: None  Delusions: No  Memory:Normal: No  Memory: Poor Remote  Insight and Judgment: Yes  Insight and Judgment: Poor Insight  Present Suicidal Ideation: No  Present Homicidal Ideation: No    Daily Assessment Last Entry:   Daily Sleep (WDL): Within Defined Limits Patient Currently in Pain: Denies  Daily Nutrition (WDL): Within Defined Limits  Appetite Change: Normal for patient  Barriers to Nutrition: None  Level of Assistance: Independent/Self    Patient Monitoring:  Frequency of Checks: 4 times per hour, close    Psychiatric Symptoms:   Depression Symptoms  Depression Symptoms: Isolative  Anxiety Symptoms  Anxiety Symptoms: Generalized  Maggie Symptoms  Maggie Symptoms: No problems reported or observed. Psychosis Symptoms  Delusion Type: No problems reported or observed. Suicide Risk CSSR-S:  1) Within the past month, have you wished you were dead or wished you could go to sleep and not wake up? : No  2) Have you actually had any thoughts of killing yourself? : No  6) Have you ever done anything, started to do anything, or prepared to do anything to end your life?: No  Change in Result No. Change in Plan of care No.      EDUCATION:   Learner Progress Toward Treatment Goals: Reviewed results and recommendations of this team    Method: Small group    Outcome: Verbalized understanding    PATIENT GOALS: Finish my crossword puzzle    PLAN/TREATMENT RECOMMENDATIONS UPDATE: Encourage group participation and continue to provide emotional support.     GOALS UPDATE:   Time frame for Short-Term Goals: 1-3 days      Skylar Denton RN

## 2021-04-29 NOTE — CARE COORDINATION
Per Benny Garcia, pt's father verbalized a lot of concerns and asked for information regarding pt's pink slip and admission to the [address] [facility] unit. ALLI signed and pt verbalized to Benny Garcia that he was comfortable with the  Supervisor providing information about his Behavioral Health stay and pink slip criteria. SW Supervisor called pt's father Daquan ([phone]) to discuss concerns. He reported that he does not feel that pt should've been pink slipped and that he should be discharged from the [address] Unit.  Supervisor provided active listening and emotional support, explained pink slip criteria and the admission process from the [facility] to the [address] Unit. Pt's father verbalized understanding, however he still does not believe that pt should've been pink slipped to the Behavioral Health Unit and asked to file a complaint.  provided pt's father with the Patient Advocate's phone number.  Pt's father verbalized appreciation of  Supervisor and [address] team.    JIM Harper, [place] [address] Work Supervisor

## 2021-04-29 NOTE — PLAN OF CARE
Problem: Altered Mood, Depressive Behavior:  Goal: Able to verbalize acceptance of life and situations over which he or she has no control  Description: Able to verbalize acceptance of life and situations over which he or she has no control  Outcome: Met This Shift     Problem: Altered Mood, Depressive Behavior:  Goal: Able to verbalize and/or display a decrease in depressive symptoms  Description: Able to verbalize and/or display a decrease in depressive symptoms  Outcome: Met This Shift     Problem: Altered Mood, Depressive Behavior:  Goal: Able to verbalize support systems  Description: Able to verbalize support systems  Outcome: Met This Shift     Problem: Altered Mood, Depressive Behavior:  Goal: Absence of self-harm  Description: Absence of self-harm  Outcome: Met This Shift     Problem: Altered Mood, Deterioration in Function:  Goal: Ability to perform activities of daily living will improve  Description: Ability to perform activities of daily living will improve  Outcome: Met This Shift     Problem: Altered Mood, Deterioration in Function:  Goal: Able to verbalize reality based thinking  Description: Able to verbalize reality based thinking  Outcome: Met This Shift     Problem: Altered Mood, Deterioration in Function:  Goal: Maintenance of adequate nutrition will improve  Description: Maintenance of adequate nutrition will improve  Outcome: Met This Shift

## 2021-04-29 NOTE — PLAN OF CARE
Patient denies SI/HI/AVH. Patient states he is feeling less anxious and his appetite is improving. He is resting comfortably in bed. Patient appears sad, brightens with conversation, denies any needs at this time.      Problem: Altered Mood, Depressive Behavior:  Goal: Able to verbalize support systems  Description: Able to verbalize support systems  4/28/2021 2252 by Romario Barker RN  Outcome: Met This Shift     Problem: Altered Mood, Depressive Behavior:  Goal: Able to verbalize acceptance of life and situations over which he or she has no control  Description: Able to verbalize acceptance of life and situations over which he or she has no control  Outcome: Ongoing     Problem: Altered Mood, Depressive Behavior:  Goal: Able to verbalize and/or display a decrease in depressive symptoms  Description: Able to verbalize and/or display a decrease in depressive symptoms  4/28/2021 2252 by Romario Barker RN  Outcome: Ongoing

## 2021-04-30 PROCEDURE — 6370000000 HC RX 637 (ALT 250 FOR IP): Performed by: PSYCHIATRY & NEUROLOGY

## 2021-04-30 PROCEDURE — 1240000000 HC EMOTIONAL WELLNESS R&B

## 2021-04-30 PROCEDURE — 99231 SBSQ HOSP IP/OBS SF/LOW 25: CPT | Performed by: NURSE PRACTITIONER

## 2021-04-30 PROCEDURE — 6370000000 HC RX 637 (ALT 250 FOR IP): Performed by: NURSE PRACTITIONER

## 2021-04-30 RX ADMIN — HYDROXYZINE PAMOATE 50 MG: 50 CAPSULE ORAL at 06:18

## 2021-04-30 RX ADMIN — VENLAFAXINE HYDROCHLORIDE 75 MG: 75 CAPSULE, EXTENDED RELEASE ORAL at 08:55

## 2021-04-30 RX ADMIN — Medication 6 MG: at 18:24

## 2021-04-30 ASSESSMENT — PAIN SCALES - GENERAL: PAINLEVEL_OUTOF10: 0

## 2021-04-30 NOTE — SUICIDE SAFETY PLAN
SAFETY PLAN    A suicide Safety Plan is a document that supports someone when they are having thoughts of suicide. Warning Signs that indicate a suicidal crisis may be developing: What (situations, thoughts, feelings, body sensations, behaviors, etc.) do you experience that lets you know you are beginning to think about suicide? 1. Tightness in chest  2. Racing thoughts  3. Suicidal thoughts    Internal Coping Strategies:  What things can I do (relaxation techniques, hobbies, physical activities, etc.) to take my mind off my problems without contacting another person?  ++++ \"I have not been suicidal not have any plans to commit suicide\".++++  1. Reading  2. Running  3. Working out ( [employer] lifting)    People and social settings that provide distraction: Who can I call or where can I go to distract me? 1. Name: Rell Hobson  Phone: [phone]  2. Name: Daquan  Phone: [phone]   3. Place: [facility]            4. Place: [facility] ( home address)    People whom I can ask for help: Who can I call when I need help - for example, friends, family, clergy, someone else? 1. Name:Daquan              Phone: [phone]  2. Name: Rell Hobson            Phone: [phone]  3. Name: [address] I can contact during a crisis: Who can I call for help - for example, my doctor, my psychiatrist, my psychologist, a mental health provider, a suicide hotline? 1. Clinician Name:   Phone:      Clinician Pager or Emergency Contact #:    2. Clinician Name:   Phone:      Clinician Pager or Emergency Contact #:    3. Suicide Prevention Lifeline: 3-401-539-TALK (9861)    4. [address] [place] [address] Emergency Services -  for example, [facility] suicide hotline, [facility] Hotline: #037        Making the environment safe: How can I make my environment (house/apartment/living space) safer? For example, can I remove guns, medications, and other items? 1.  No

## 2021-04-30 NOTE — PROGRESS NOTES
Group Therapy Note    Patient attended goals group and stated daily goal as to read my book to relax. Group Therapy Note    Date: 4/30/2021  Start Time: 10:00  End Time: 10:30  Number of Participants: 9    Type of Group: Psychoeducation    Wellness Binder Information  Module Name: Coping skills  Session Number: NA    Patient's Goal: To id positive coping skills to use on a daily basis. Notes: Attended group and was able to stay in group for awhile but was pacing the floor as well. Status After Intervention:  Improved    Participation Level:  Active Listener    Participation Quality: Attentive      Speech:  hesitant      Thought Process/Content: Linear      Affective Functioning: Blunted      Mood: anxious and depressed      Level of consciousness:  Alert      Response to Learning: Progressing to goal      Endings: None Reported    Modes of Intervention: Education      Discipline Responsible: Psychoeducational Specialist      Signature:  KINGS Payne

## 2021-04-30 NOTE — GROUP NOTE
Group Therapy Note    Date: 4/30/2021    Group Start Time: 1400  Group End Time: 1500  Group Topic: Cognitive Skills    [facility] ACUTE [facility] 1    JIM James, W    Number of participants:7  Type of group: Cognitive Skills  Mode of intervention: Education, Support, Socialization, Exploration, Clarifying, and Problem-solving  Topic: Mental health barriers  Objective: To improve communication skills    Group Therapy Note           Notes: Pt was an active participant in cognitive skills group focusing on communication skills and improving interpersonal relationships. Pt was able to share personal information and provide supportive feedback to group members. Status After Intervention:  Improved    Participation Level:  Active Listener and Interactive    Participation Quality: Appropriate, Attentive and Sharing      Speech:  normal      Thought Process/Content: Logical      Affective Functioning: Congruent      Mood: euthymic      Level of consciousness:  Alert, Oriented x4 and Attentive      Response to Learning: Progressing to goal      Endings: None Reported    Modes of Intervention: Education, Support, Socialization, Exploration, Clarifying and Problem-solving      Discipline Responsible: /Counselor      Signature:  JIM James, [facility]

## 2021-04-30 NOTE — CARE COORDINATION
NP [place] states that this pt's medication was adjusted, and he will be staying over the weekend to be observed by staff. Pt is ok with this treatment plan. Sw will revisit this pt's discharge plan on Monday.

## 2021-04-30 NOTE — PLAN OF CARE
Patient remains alert and oriented x4. He denies any SI/HI and does not report any auditory or visual hallucinations. He continues to have anxiety but states that after taking vistaril he is able to cope with it a little better. Patient still has flat and sad affect but will brighten with conversation. Rates depression 3 at this time. Patient concerned that he is not doing something right because he is not being discharged today. Patient reassured that he is doing everything the needs to we just want to make sure he is OK and continue to monitor his anxiety level as he continues to have issues with it. Patient states he is OK with not leaving today. Patient needs continued reassurance that he is doing well.       Problem: Altered Mood, Depressive Behavior:  Goal: Able to verbalize acceptance of life and situations over which he or she has no control  Description: Able to verbalize acceptance of life and situations over which he or she has no control  Outcome: Ongoing     Problem: Altered Mood, Depressive Behavior:  Goal: Able to verbalize and/or display a decrease in depressive symptoms  Description: Able to verbalize and/or display a decrease in depressive symptoms  Outcome: Ongoing     Problem: Altered Mood, Depressive Behavior:  Goal: Ability to disclose and discuss suicidal ideas will improve  Description: Ability to disclose and discuss suicidal ideas will improve  Outcome: Ongoing     Problem: Altered Mood, Deterioration in Function:  Goal: Able to verbalize reality based thinking  Description: Able to verbalize reality based thinking  Outcome: Ongoing     Problem: Altered Mood, Deterioration in Function:  Goal: Patient specific goal  Description: Patient specific goal  Outcome: Ongoing

## 2021-04-30 NOTE — PROGRESS NOTES
BEHAVIORAL HEALTH FOLLOW-UP NOTE     4/30/2021     Patient was seen and examined in person, Chart reviewed   Patient's case discussed with staff/team    Chief Complaint: \"I did not like the trazodone. \"  Interim History:     Patient is observed up on the unit, he is less anxious than initial encounter. However he remains guarded and paranoid. He is very watchful while up on the unit, he remains anxious. During conversation his thoughts appear to be more linear, there is a decrease in latency of response but not as severe as yesterday. Processing seems to be improving, however he appears to have difficulty understanding. The more conversation the patient engages in, the more confused he appears and the more difficulty processing he has. He is compliant with his medications, however very cautious almost suspicious of medications. He is attending groups. Does not appear paranoid as he did yesterday however this is still present. While on the unit yesterday he was very watchful and paranoid pacing the unit. Staff reports that he slept for 7 hours. Today he is endorsing a decrease in racing thoughts, he is less isolative and less anxious. Denies suicidal or homicidal ideation intent or plan. Denies AVH. We will monitor patient for improvement on Effexor .5 mg before addition of additional medications to assist in reducing anxiety, paranoia and cognitive blunting. Appetite:  [] Normal/Unchanged  [] Increased  [x] Decreased      Sleep:       [] Normal/Unchanged  [x] Fair       [] Poor              Energy:    [] Normal/Unchanged  [] Increased  [x] Decreased        SI [] Present  [x] Absent    HI  []Present  [x] Absent     Aggression:  [] yes  [x] no    Patient is [x] able  [] unable to CONTRACT FOR SAFETY     PAST MEDICAL/PSYCHIATRIC HISTORY:   History reviewed. No pertinent past medical history. FAMILY/SOCIAL HISTORY:  History reviewed. No pertinent family history.   Social History Socioeconomic History    Marital status: Single     Spouse name: Not on file    Number of children: Not on file    Years of education: Not on file    Highest education level: Not on file   Occupational History    Not on file   Social Needs    Financial resource strain: Not on file    Food insecurity     Worry: Not on file     Inability: Not on file    Transportation needs     Medical: Not on file     Non-medical: Not on file   Tobacco Use    Smoking status: Never Smoker    Smokeless tobacco: Never Used   Substance and Sexual Activity    Alcohol use: Not Currently    Drug use: Never    Sexual activity: Not on file   Lifestyle    Physical activity     Days per week: Not on file     Minutes per session: Not on file    Stress: Not on file   Relationships    Social connections     Talks on phone: Not on file     Gets together: Not on file     Attends Religion service: Not on file     Active member of club or organization: Not on file     Attends meetings of clubs or organizations: Not on file     Relationship status: Not on file    Intimate partner violence     Fear of current or ex partner: Not on file     Emotionally abused: Not on file     Physically abused: Not on file     Forced sexual activity: Not on file   Other Topics Concern    Not on file   Social History Narrative    Not on file           ROS:  [x] All negative/unchanged except if checked.  Explain positive(checked items) below:  [] Constitutional  [] Eyes  [] Ear/Nose/Mouth/Throat  [] Respiratory  [] CV  [] GI  []   [] Musculoskeletal  [] Skin/Breast  [] Neurological  [] Endocrine  [] Heme/Lymph  [] Allergic/Immunologic    Explanation:     MEDICATIONS:    Current Facility-Administered Medications:     [START ON 5/1/2021] venlafaxine (EFFEXOR XR) extended release capsule 112.5 mg, 112.5 mg, Oral, Daily with breakfast, Maryse Mayo, APRN - CNP    melatonin tablet 6 mg, 6 mg, Oral, Daily, Maryse Skains, APRN - CNP, 6 mg at 04/29/21 1729    acetaminophen (TYLENOL) tablet 650 mg, 650 mg, Oral, Q6H PRN, Bridgette Senior MD    magnesium hydroxide (MILK OF MAGNESIA) 400 MG/5ML suspension 30 mL, 30 mL, Oral, Daily PRN, Bridgette Senior MD    aluminum & magnesium hydroxide-simethicone (MAALOX) 200-200-20 MG/5ML suspension 30 mL, 30 mL, Oral, PRN, Bridgette Senior MD    hydrOXYzine (VISTARIL) capsule 50 mg, 50 mg, Oral, TID PRN, Bridgette Senior MD, 50 mg at 04/30/21 0618    haloperidol lactate (HALDOL) injection 5 mg, 5 mg, Intramuscular, Q6H PRN **OR** haloperidol (HALDOL) tablet 5 mg, 5 mg, Oral, Q6H PRN, Bridgette Senior MD    traZODone (DESYREL) tablet 50 mg, 50 mg, Oral, Nightly PRN, Bridgette Senior MD, 50 mg at 04/29/21 2159      Examination:  BP (!) 142/71   Pulse 73   Temp 97.8 °F (36.6 °C) (Temporal)   Resp 17   Ht 5' 8\" (1.727 m)   Wt 170 lb (77.1 kg)   SpO2 97%   BMI 25.85 kg/m²   Gait - steady  Medication side effects(SE): Denies    Mental Status Examination:    Level of consciousness:  within normal limits   Appearance:  good grooming and good hygiene  Behavior/Motor:  no abnormalities noted  Attitude toward examiner:  cooperative and attentive  Speech:  slow and increased latency   Mood: constricted and depressed  Affect:  mood congruent and blunted  Thought processes:  linear   Thought content: The patient is devoid of suicidal or homicidal ideation intent or plan. Devoid of auditory or visual hallucinations or other perceptual disturbances, there are no overt or covert signs of psychosis or paranoia. There are no neurovegetative signs of depression.   Cognition:  oriented to person, place, and time   Concentration intact  Insight improving   judgement improving    ASSESSMENT:   Patient symptoms are:  [] Well controlled  [x] Improving  [] Worsening  [] No change      Diagnosis:   Principal Problem:    Severe episode of recurrent major depressive disorder, without psychotic features ([facility] [address]  Active Problems:    Panic of inpatient psychiatric personnel      Anticipated Length of stay: 3 to 5 days based on stability            Electronically signed by RADHA Lai CNP on 4/30/2021 at 1:06 PM

## 2021-04-30 NOTE — PROGRESS NOTES
Spent time with patient discussing anxiety and how he has been managing stress. Patient has insight in knowing that he needs to take more time for himself to de-stress and find more pleasure activities. Patient discussed that he has enjoyed reading in the past and is going to try and do more. Patient states he is going to try and manage his anxiety using less vistrial and using other means. Patient states that he will only use medications when other means do not work for him. Patient was open with dialog.

## 2021-05-01 PROCEDURE — 99232 SBSQ HOSP IP/OBS MODERATE 35: CPT | Performed by: NURSE PRACTITIONER

## 2021-05-01 PROCEDURE — 1240000000 HC EMOTIONAL WELLNESS R&B

## 2021-05-01 PROCEDURE — 6370000000 HC RX 637 (ALT 250 FOR IP): Performed by: NURSE PRACTITIONER

## 2021-05-01 RX ADMIN — VENLAFAXINE HYDROCHLORIDE 112.5 MG: 75 CAPSULE, EXTENDED RELEASE ORAL at 10:42

## 2021-05-01 RX ADMIN — Medication 6 MG: at 21:36

## 2021-05-01 NOTE — GROUP NOTE
Group Therapy Note    Date: 5/1/2021    Group Start Time: 0210  Group End Time: 1447  Group Topic: Psychoeducation    [facility]        Group Therapy Note    Attendees: 9         Notes: Active and engaged during discussion on A.[facility].E.P.T.S. Able to identify activities to manage distressing emotions. Status After Intervention:  Improved    Participation Level:  Active Listener and Interactive    Participation Quality: Appropriate, Attentive and Sharing      Speech:  normal      Thought Process/Content: Logical      Affective Functioning: Congruent      Mood: euthymic      Level of consciousness:  Alert and Attentive      Response to Learning: Capable of insight, Able to change behavior and Progressing to goal      Endings: None Reported    Modes of Intervention: Education, Support, Socialization and Exploration      Discipline Responsible: Psychoeducational Specialist      Signature:  Luca Mandel [place]

## 2021-05-01 NOTE — PLAN OF CARE
Problem: Altered Mood, Depressive Behavior:  Goal: Able to verbalize acceptance of life and situations over which he or she has no control  Description: Able to verbalize acceptance of life and situations over which he or she has no control  5/1/2021 1110 by Danelle Jordan RN  Outcome: Ongoing     Problem: Altered Mood, Depressive Behavior:  Goal: Able to verbalize and/or display a decrease in depressive symptoms  Description: Able to verbalize and/or display a decrease in depressive symptoms  5/1/2021 1110 by Danelle Jordan RN  Outcome: Ongoing     Problem: Altered Mood, Depressive Behavior:  Goal: Absence of self-harm  Description: Absence of self-harm  5/1/2021 1110 by Danelle Jordan RN  Outcome: Met This Shift     Pt denies SI HI and AVH. Pt states that his anxiety and depression are manageable and did not rate. Pt is paranoid and worried about his medications and the dose he is taking. Pt stated he will talk to the doctor about it. Pt out on unit social with select. Pt taking prescribed medications and eating provided meals. Will continue to monitor.

## 2021-05-01 NOTE — PLAN OF CARE
Patient A+Ox 4. Rates anxiety 4/10 and depression 1/10. Calm, pleasant demeanor. Good eye contact. Sad and flat affect, however patient states that he is feeling better. Patient responds appropriately to conversation.  No behaviors observed  Problem: Altered Mood, Depressive Behavior:  Goal: Able to verbalize acceptance of life and situations over which he or she has no control  Description: Able to verbalize acceptance of life and situations over which he or she has no control  4/30/2021 2129 by Cirilo Vallejo RN  Outcome: Ongoing  4/30/2021 1246 by Elda Beck RN  Outcome: Ongoing     Problem: Altered Mood, Depressive Behavior:  Goal: Able to verbalize and/or display a decrease in depressive symptoms  Description: Able to verbalize and/or display a decrease in depressive symptoms  4/30/2021 2129 by Cirilo Vallejo RN  Outcome: Ongoing  4/30/2021 1246 by Elda Beck RN  Outcome: Ongoing

## 2021-05-01 NOTE — PROGRESS NOTES
Attends Spiritism service: Not on file     Active member of club or organization: Not on file     Attends meetings of clubs or organizations: Not on file     Relationship status: Not on file    Intimate partner violence     Fear of current or ex partner: Not on file     Emotionally abused: Not on file     Physically abused: Not on file     Forced sexual activity: Not on file   Other Topics Concern    Not on file   Social History Narrative    Not on file           ROS:  [x] All negative/unchanged except if checked.  Explain positive(checked items) below:  [] Constitutional  [] Eyes  [] Ear/Nose/Mouth/Throat  [] Respiratory  [] CV  [] GI  []   [] Musculoskeletal  [] Skin/Breast  [] Neurological  [] Endocrine  [] Heme/Lymph  [] Allergic/Immunologic    Explanation:     MEDICATIONS:    Current Facility-Administered Medications:     venlafaxine (EFFEXOR XR) extended release capsule 112.5 mg, 112.5 mg, Oral, Daily with breakfast, RADHA Arredondo - CNP, 112.5 mg at 05/01/21 1042    melatonin tablet 6 mg, 6 mg, Oral, Daily, RADHA Arredondo CNP, 6 mg at 04/30/21 1824    acetaminophen (TYLENOL) tablet 650 mg, 650 mg, Oral, Q6H PRN, Kimber Michel MD    magnesium hydroxide (MILK OF MAGNESIA) 400 MG/5ML suspension 30 mL, 30 mL, Oral, Daily PRN, Kimber Michel MD    aluminum & magnesium hydroxide-simethicone (MAALOX) 200-200-20 MG/5ML suspension 30 mL, 30 mL, Oral, PRN, Kimber Michel MD    hydrOXYzine (VISTARIL) capsule 50 mg, 50 mg, Oral, TID PRN, Kimber Michel MD, 50 mg at 04/30/21 0618    haloperidol lactate (HALDOL) injection 5 mg, 5 mg, Intramuscular, Q6H PRN **OR** haloperidol (HALDOL) tablet 5 mg, 5 mg, Oral, Q6H PRN, Kimber Michel MD    traZODone (DESYREL) tablet 50 mg, 50 mg, Oral, Nightly PRN, Kimber Michel MD, 50 mg at 04/29/21 6336      Examination:  /78   Pulse 82   Temp 98.4 °F (36.9 °C) (Temporal)   Resp 16   Ht 5' 8\" (1.727 m)   Wt 170 lb (77.1 kg)   SpO2 97%   BMI 25.85 kg/m²   Gait - steady  Medication side effects(SE): Denies    Mental Status Examination:    Level of consciousness:  within normal limits   Appearance:  good grooming and good hygiene  Behavior/Motor:  no abnormalities noted  Attitude toward examiner:  cooperative and attentive  Speech:  slow and increased latency   Mood: constricted and depressed  Affect:  mood congruent and blunted  Thought processes:  linear   Thought content: The patient is devoid of suicidal or homicidal ideation intent or plan. Devoid of auditory or visual hallucinations or other perceptual disturbances, there are no overt or covert signs of psychosis or paranoia. There are no neurovegetative signs of depression. Cognition:  oriented to person, place, and time   Concentration intact  Insight improving   judgement improving    ASSESSMENT:   Patient symptoms are:  [] Well controlled  [x] Improving  [] Worsening  [] No change      Diagnosis:   Principal Problem:    Severe episode of recurrent major depressive disorder, without psychotic features ([facility]  Active Problems:    Panic disorder without agoraphobia  Resolved Problems:    * No resolved hospital problems. *      LABS:    No results for input(s): WBC, HGB, PLT in the last 72 hours. No results for input(s): NA, K, CL, CO2, BUN, CREATININE, GLUCOSE in the last 72 hours. No results for input(s): BILITOT, ALKPHOS, AST, ALT in the last 72 hours.   Lab Results   Component Value Date    LABAMPH NOT DETECTED 04/26/2021    BARBSCNU NOT DETECTED 04/26/2021    LABBENZ NOT DETECTED 04/26/2021    LABMETH NOT DETECTED 04/26/2021    OPIATESCREENURINE NOT DETECTED 04/26/2021    PHENCYCLIDINESCREENURINE NOT DETECTED 04/26/2021    ETOH <10 04/26/2021     Lab Results   Component Value Date    TSH 1.130 04/26/2021     No results found for: LITHIUM  No results found for: VALPROATE, CBMZ        Treatment Plan:  The patient's diagnosis, treatment plan, medication management were formulated after patient was seen directly by the attending physician and myself and all relevant documentation was reviewed. Reviewed current Medications with the patient. Risk, benefit, side effects, possible outcomes of the medication and alternatives discussed with the patient and the patient demonstrated understanding. The patient was also educated that the outcome of treatment will depend on the medication compliance as directed by the prescribers along with regular follow-up, compliance with the labs and other work-up, as clinically indicated. Effexor .5 mg daily for depression and anxiety  Melatonin 6 mg daily at 1800 to reset sleep-wake cycle    Collateral information: Followed by social work   CD evaluation  Encourage patient to attend group and other milieu activities.   Discharge planning discussed with the patient and treatment team.    PSYCHOTHERAPY/COUNSELING:  [x] Therapeutic interview  [x] Supportive  [] CBT  [] Ongoing  [] Other    [x] Patient continues to need, on a daily basis, active treatment furnished directly by or requiring the supervision of inpatient psychiatric personnel      Anticipated Length of stay: 3 to 5 days based on stability            Electronically signed by RADHA Campbell CNP on 8/2/1538 at 1:28 PM

## 2021-05-02 PROCEDURE — 6370000000 HC RX 637 (ALT 250 FOR IP): Performed by: PSYCHIATRY & NEUROLOGY

## 2021-05-02 PROCEDURE — 6370000000 HC RX 637 (ALT 250 FOR IP): Performed by: NURSE PRACTITIONER

## 2021-05-02 PROCEDURE — 1240000000 HC EMOTIONAL WELLNESS R&B

## 2021-05-02 PROCEDURE — 99232 SBSQ HOSP IP/OBS MODERATE 35: CPT | Performed by: NURSE PRACTITIONER

## 2021-05-02 RX ADMIN — Medication 6 MG: at 18:08

## 2021-05-02 RX ADMIN — VENLAFAXINE HYDROCHLORIDE 112.5 MG: 75 CAPSULE, EXTENDED RELEASE ORAL at 09:34

## 2021-05-02 RX ADMIN — TRAZODONE HYDROCHLORIDE 50 MG: 50 TABLET ORAL at 22:12

## 2021-05-02 ASSESSMENT — PAIN SCALES - GENERAL: PAINLEVEL_OUTOF10: 0

## 2021-05-02 NOTE — PROGRESS NOTES
Patient out on unit watching tv, coloring, helpful with other patients. States he is feeling so much better. Feels the medications are helping. Pt denies suicidal and homicide ideations. Denies auditory and visual hallucinations. 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 He believed all of those things combined lead to his panic attack while working. Cooperative with care. Taking meds and going to group. Engaged in his recovery. Observing closely.

## 2021-05-02 NOTE — GROUP NOTE
Group Therapy Note    Date: 5/2/2021    Group Start Time: 1115  Group End Time: 1200  Group Topic: Psychoeducation    [facility] AC[facility]        Group Therapy Note    Attendees: 10         Notes: Active and engaged during discussion group on developing a self care plan. Pleasant and social sharing experiences with peers. Status After Intervention:  Improved    Participation Level:  Active Listener and Interactive    Participation Quality: Appropriate, Attentive, Sharing and Supportive      Speech:  normal      Thought Process/Content: Logical      Affective Functioning: Congruent      Mood: euthymic      Level of consciousness:  Alert, Oriented x4 and Attentive      Response to Learning: Capable of insight, Able to change behavior and Progressing to goal      Endings: None Reported    Modes of Intervention: Education, Support, Socialization and Exploration      Discipline Responsible: Psychoeducational Specialist      Signature:  Taylor Barkley, [address]

## 2021-05-02 NOTE — PLAN OF CARE
Problem: Altered Mood, Depressive Behavior:  Goal: Able to verbalize acceptance of life and situations over which he or she has no control  Description: Able to verbalize acceptance of life and situations over which he or she has no control  Outcome: Ongoing     Problem: Altered Mood, Depressive Behavior:  Goal: Able to verbalize and/or display a decrease in depressive symptoms  Description: Able to verbalize and/or display a decrease in depressive symptoms  Outcome: Ongoing     Problem: Altered Mood, Depressive Behavior:  Goal: Ability to disclose and discuss suicidal ideas will improve  Description: Ability to disclose and discuss suicidal ideas will improve  Outcome: Met This Shift     Problem: Altered Mood, Depressive Behavior:  Goal: Absence of self-harm  Description: Absence of self-harm  Outcome: Met This Shift     Pt denies SI HI and AVH. Pt states his anxiety and depression are manageable. Pt is observed on unit social with select peers and is helpful to other patient. Pt is taking prescribed medication and attending groups. Will continue to monitor and offer support.

## 2021-05-02 NOTE — PROGRESS NOTES
BEHAVIORAL HEALTH FOLLOW-UP NOTE     5/2/2021     Patient was seen and examined in person, Chart reviewed   Patient's case discussed with staff/team    Chief Complaint: \"I am feeling much better. \"      Interim History: Patient is up on the unit affect is brighter more pleasant. He vehemently denies any SI/HI intent or plan he denies any auditory visual hallucinations. He states he is feeling \"much better. \"  He states he is eating well sleeping well no neurovegetative signs of depression. He states his anxiety and depression are much improved and he voices readiness for discharge    Appetite:  [] Normal/Unchanged  [] Increased  [x] Decreased      Sleep:       [] Normal/Unchanged  [x] Fair       [] Poor              Energy:    [] Normal/Unchanged  [] Increased  [x] Decreased        SI [] Present  [x] Absent    HI  []Present  [x] Absent     Aggression:  [] yes  [x] no    Patient is [x] able  [] unable to CONTRACT FOR SAFETY     PAST MEDICAL/PSYCHIATRIC HISTORY:   History reviewed. No pertinent past medical history. FAMILY/SOCIAL HISTORY:  History reviewed. No pertinent family history.   Social History     Socioeconomic History    Marital status: Single     Spouse name: Not on file    Number of children: Not on file    Years of education: Not on file    Highest education level: Not on file   Occupational History    Not on file   Social Needs    Financial resource strain: Not on file    Food insecurity     Worry: Not on file     Inability: Not on file    Transportation needs     Medical: Not on file     Non-medical: Not on file   Tobacco Use    Smoking status: Never Smoker    Smokeless tobacco: Never Used   Substance and Sexual Activity    Alcohol use: Not Currently    Drug use: Never    Sexual activity: Not on file   Lifestyle    Physical activity     Days per week: Not on file     Minutes per session: Not on file    Stress: Not on file   Relationships    Social connections     Talks on phone: Not on file     Gets together: Not on file     Attends Anabaptist service: Not on file     Active member of club or organization: Not on file     Attends meetings of clubs or organizations: Not on file     Relationship status: Not on file    Intimate partner violence     Fear of current or ex partner: Not on file     Emotionally abused: Not on file     Physically abused: Not on file     Forced sexual activity: Not on file   Other Topics Concern    Not on file   Social History Narrative    Not on file           ROS:  [x] All negative/unchanged except if checked.  Explain positive(checked items) below:  [] Constitutional  [] Eyes  [] Ear/Nose/Mouth/Throat  [] Respiratory  [] CV  [] GI  []   [] Musculoskeletal  [] Skin/Breast  [] Neurological  [] Endocrine  [] Heme/Lymph  [] Allergic/Immunologic    Explanation:     MEDICATIONS:    Current Facility-Administered Medications:     venlafaxine (EFFEXOR XR) extended release capsule 112.5 mg, 112.5 mg, Oral, Daily with breakfast, RADHA Escalante - SADIA, 112.5 mg at 05/02/21 0934    melatonin tablet 6 mg, 6 mg, Oral, Daily, RADHA Escalante CNP, 6 mg at 05/01/21 2136    acetaminophen (TYLENOL) tablet 650 mg, 650 mg, Oral, Q6H PRN, Tammy Riedel, MD    magnesium hydroxide (MILK OF MAGNESIA) 400 MG/5ML suspension 30 mL, 30 mL, Oral, Daily PRN, Tammy Riedel, MD    aluminum & magnesium hydroxide-simethicone (MAALOX) 200-200-20 MG/5ML suspension 30 mL, 30 mL, Oral, PRN, Tammy Riedel, MD    hydrOXYzine (VISTARIL) capsule 50 mg, 50 mg, Oral, TID PRN, Tammy Riedel, MD, 50 mg at 04/30/21 0618    haloperidol lactate (HALDOL) injection 5 mg, 5 mg, Intramuscular, Q6H PRN **OR** haloperidol (HALDOL) tablet 5 mg, 5 mg, Oral, Q6H PRN, Tammy Riedel, MD    traZODone (DESYREL) tablet 50 mg, 50 mg, Oral, Nightly PRN, Tammy Riedel, MD, 50 mg at 04/29/21 6787      Examination:  BP (!) 142/94   Pulse 78   Temp 97.8 °F (36.6 °C) (Temporal)   Resp 16   Ht 5' 8\" (1.727 m)   Wt 170 lb (77.1 kg)   SpO2 98%   BMI 25.85 kg/m²   Gait - steady  Medication side effects(SE): Denies    Mental Status Examination:    Level of consciousness:  within normal limits   Appearance:  good grooming and good hygiene  Behavior/Motor:  no abnormalities noted  Attitude toward examiner:  cooperative and attentive  Speech:  slow and increased latency   Mood: constricted and depressed  Affect:  mood congruent and blunted  Thought processes:  linear   Thought content: The patient is devoid of suicidal or homicidal ideation intent or plan. Devoid of auditory or visual hallucinations or other perceptual disturbances, there are no overt or covert signs of psychosis or paranoia. There are no neurovegetative signs of depression. Cognition:  oriented to person, place, and time   Concentration intact  Insight improving   judgement improving    ASSESSMENT:   Patient symptoms are:  [] Well controlled  [x] Improving  [] Worsening  [] No change      Diagnosis:   Principal Problem:    Severe episode of recurrent major depressive disorder, without psychotic features ([facility] [address]  Active Problems:    Panic disorder without agoraphobia  Resolved Problems:    * No resolved hospital problems. *      LABS:    No results for input(s): WBC, HGB, PLT in the last 72 hours. No results for input(s): NA, K, CL, CO2, BUN, CREATININE, GLUCOSE in the last 72 hours. No results for input(s): BILITOT, ALKPHOS, AST, ALT in the last 72 hours.   Lab Results   Component Value Date    LABAMPH NOT DETECTED 04/26/2021    BARBSCNU NOT DETECTED 04/26/2021    LABBENZ NOT DETECTED 04/26/2021    LABMETH NOT DETECTED 04/26/2021    OPIATESCREENURINE NOT DETECTED 04/26/2021    PHENCYCLIDINESCREENURINE NOT DETECTED 04/26/2021    ETOH <10 04/26/2021     Lab Results   Component Value Date    TSH 1.130 04/26/2021     No results found for: LITHIUM  No results found for: VALPROATE, CBMZ        Treatment Plan:  The patient's diagnosis, treatment plan, medication management were formulated after patient was seen directly by the attending physician and myself and all relevant documentation was reviewed. Reviewed current Medications with the patient. Risk, benefit, side effects, possible outcomes of the medication and alternatives discussed with the patient and the patient demonstrated understanding. The patient was also educated that the outcome of treatment will depend on the medication compliance as directed by the prescribers along with regular follow-up, compliance with the labs and other work-up, as clinically indicated. Effexor .5 mg daily for depression and anxiety  Melatonin 6 mg daily at 1800 to reset sleep-wake cycle    Collateral information: Followed by social work   CD evaluation  Encourage patient to attend group and other milieu activities.   Discharge planning discussed with the patient and treatment team.    PSYCHOTHERAPY/COUNSELING:  [x] Therapeutic interview  [x] Supportive  [] CBT  [] Ongoing  [] Other    [x] Patient continues to need, on a daily basis, active treatment furnished directly by or requiring the supervision of inpatient psychiatric personnel      Anticipated Length of stay: 3 to 5 days based on stability            Electronically signed by RADHA Chung CNP on 1/3/3530 at 4:00 PM

## 2021-05-03 VITALS
RESPIRATION RATE: 17 BRPM | HEIGHT: 68 IN | OXYGEN SATURATION: 98 % | DIASTOLIC BLOOD PRESSURE: 89 MMHG | HEART RATE: 77 BPM | WEIGHT: 170 LBS | TEMPERATURE: 97.1 F | SYSTOLIC BLOOD PRESSURE: 133 MMHG | BODY MASS INDEX: 25.76 KG/M2

## 2021-05-03 PROCEDURE — 99239 HOSP IP/OBS DSCHRG MGMT >30: CPT | Performed by: NURSE PRACTITIONER

## 2021-05-03 PROCEDURE — 6370000000 HC RX 637 (ALT 250 FOR IP): Performed by: NURSE PRACTITIONER

## 2021-05-03 RX ORDER — LANOLIN ALCOHOL/MO/W.PET/CERES
6 CREAM (GRAM) TOPICAL DAILY
Qty: 60 TABLET | Refills: 0 | Status: SHIPPED | OUTPATIENT
Start: 2021-05-03 | End: 2021-06-02

## 2021-05-03 RX ORDER — VENLAFAXINE HYDROCHLORIDE 37.5 MG/1
112.5 CAPSULE, EXTENDED RELEASE ORAL
Qty: 90 CAPSULE | Refills: 0 | Status: SHIPPED | OUTPATIENT
Start: 2021-05-04 | End: 2021-06-03

## 2021-05-03 RX ORDER — HYDROXYZINE PAMOATE 50 MG/1
50 CAPSULE ORAL 3 TIMES DAILY PRN
Qty: 90 CAPSULE | Refills: 0 | Status: SHIPPED | OUTPATIENT
Start: 2021-05-03 | End: 2021-06-02

## 2021-05-03 RX ADMIN — VENLAFAXINE HYDROCHLORIDE 112.5 MG: 75 CAPSULE, EXTENDED RELEASE ORAL at 09:11

## 2021-05-03 NOTE — PROGRESS NOTES
CLINICAL PHARMACY NOTE: MEDS TO [address] Select Patient?: Yes  Total # of Prescriptions Filled: 2   The following medications were delivered to the patient:  · Venlafaxine er 37.5 mg  · Hydroxyzine pamoate 50 mg capsules  Total # of Interventions Completed: 2  Time Spent (min): 30    Additional Documentation:

## 2021-05-03 NOTE — CARE COORDINATION
In order to ensure appropriate transition and discharge planning is in place, the following documents have been transmitted to [facility], as the new outpatient provider:     · The d/c diagnosis was transmitted to the next care provider  · The reason for hospitalization was transmitted to the next care provider  · The d/c medications (dosage and indication) were transmitted to the next care provider   · The continuing care plan was transmitted to the next care provider

## 2021-05-03 NOTE — PROGRESS NOTES
[facility]  Discharge Note    Pt discharged with followings belongings:   Dentures: None  Vision - Corrective Lenses: None  Hearing Aid: None  Jewelry: None  Clothing: Shirt, Pants, Footwear, Socks  Were All Patient Medications Collected?: Not Applicable  Other Valuables: Other (Comment)(backpack with assorted work supplies)   Valuables sent home with patient family prior to discharge. Patient education on aftercare instructions: given both verbal and written   Patient verbalize understanding of AVS:  yes. Status EXAM upon discharge:  Status and Exam  Normal: No  Facial Expression: Worried  Affect: Congruent  Level of Consciousness: Alert  Mood:Normal: No  Mood: Sad  Motor Activity:Normal: Yes  Motor Activity: Increased  Interview Behavior: Cooperative, Evasive  Preception: [place] to Person, Edmond Ditto to Time, [place] to Place, [place] to Situation  Attention:Normal: No  Attention: Distractible  Thought Processes: Circumstantial  Thought Content:Normal: No  Thought Content: Preoccupations  Hallucinations: None  Delusions: No  Memory:Normal: Yes  Memory: Poor Recent  Insight and Judgment: No  Insight and Judgment: Poor Judgment, Poor Insight  Present Suicidal Ideation: No  Present Homicidal Ideation: No      Metabolic Screening:    No results found for: LABA1C    No results found for: CHOL  No results found for: TRIG  No results found for: HDL  No components found for: LDLCAL  No results found for: LABVLDL    Patient discharged to the care of father and he was going to  his prescriptions from [address] on his own.     Sukumar Weiss RN

## 2021-05-03 NOTE — DISCHARGE SUMMARY
DISCHARGE SUMMARY      Patient ID:  Beto Haney  [id number]  81 y.o.  1990    Admit date: 4/26/2021    Discharge date and time: 5/3/2021    Admitting Physician: Dyllan Valdez MD     Discharge Physician: Dr Johnny Denney MD    Discharge Diagnoses:   Patient Active Problem List   Diagnosis    Acute appendicitis    Severe episode of recurrent major depressive disorder, without psychotic features ([address]    Panic disorder without agoraphobia       Admission Condition: poor    Discharged Condition: stable    Admission Circumstance:   Patient presented to [facility] emergency department for anxiety, racing thoughts, paranoia stating that his mental health has been suffering for about a year, and reporting that he has not wanted to take psychiatric medications. PAST MEDICAL/PSYCHIATRIC HISTORY:   History reviewed. No pertinent past medical history. FAMILY/SOCIAL HISTORY:  History reviewed. No pertinent family history.   Social History     Socioeconomic History    Marital status: Single     Spouse name: Not on file    Number of children: Not on file    Years of education: Not on file    Highest education level: Not on file   Occupational History    Not on file   Social Needs    Financial resource strain: Not on file    Food insecurity     Worry: Not on file     Inability: Not on file    Transportation needs     Medical: Not on file     Non-medical: Not on file   Tobacco Use    Smoking status: Never Smoker    Smokeless tobacco: Never Used   Substance and Sexual Activity    Alcohol use: Not Currently    Drug use: Never    Sexual activity: Not on file   Lifestyle    Physical activity     Days per week: Not on file     Minutes per session: Not on file    Stress: Not on file   Relationships    Social connections     Talks on phone: Not on file     Gets together: Not on file     Attends Cheondoism service: Not on file     Active member of club or organization: Not on file     Attends meetings of clubs or organizations: Not on file     Relationship status: Not on file    Intimate partner violence     Fear of current or ex partner: Not on file     Emotionally abused: Not on file     Physically abused: Not on file     Forced sexual activity: Not on file   Other Topics Concern    Not on file   Social History Narrative    Not on file       MEDICATIONS:    Current Facility-Administered Medications:     venlafaxine (EFFEXOR XR) extended release capsule 112.5 mg, 112.5 mg, Oral, Daily with breakfast, Romainyair Marrero, APRN - CNP, 112.5 mg at 05/03/21 0911    melatonin tablet 6 mg, 6 mg, Oral, Daily, Maryagnes Elida, APRN - CNP, 6 mg at 05/02/21 1808    acetaminophen (TYLENOL) tablet 650 mg, 650 mg, Oral, Q6H PRN, Camryn Berry MD    magnesium hydroxide (MILK OF MAGNESIA) 400 MG/5ML suspension 30 mL, 30 mL, Oral, Daily PRN, Camryn Berry MD    aluminum & magnesium hydroxide-simethicone (MAALOX) 200-200-20 MG/5ML suspension 30 mL, 30 mL, Oral, PRN, Camryn Berry MD    hydrOXYzine (VISTARIL) capsule 50 mg, 50 mg, Oral, TID PRN, Camryn Berry MD, 50 mg at 04/30/21 0618    haloperidol lactate (HALDOL) injection 5 mg, 5 mg, Intramuscular, Q6H PRN **OR** haloperidol (HALDOL) tablet 5 mg, 5 mg, Oral, Q6H PRN, Camryn Berry MD    traZODone (DESYREL) tablet 50 mg, 50 mg, Oral, Nightly PRN, Camryn Berry MD, 50 mg at 05/02/21 2212    Examination:  /89   Pulse 77   Temp 97.1 °F (36.2 °C) (Temporal)   Resp 17   Ht 5' 8\" (1.727 m)   Wt 170 lb (77.1 kg)   SpO2 98%   BMI 25.85 kg/m²   Gait - steady    HOSPITAL COURSE[de-identified]  Following admission to the hospital, patient had a complete physical exam and blood work up, which he was medically cleared and admitted to [facility] for psychiatric evaluation and stabilization. The patient was monitored closely with suicide and appropriate precautions.   He was started on Effexor Exar 112.5 mg daily for depression and anxiety he was encouraged to euthymic  Affect:  mood congruent  Thought processes:  linear and goal directed   Thought content: The patient is devoid of suicidal or homicidal ideation intent or plan. Devoid of auditory or visual hallucinations or other perceptual disturbances, there are no overt or covert signs of psychosis or paranoia. There are no neurovegetative signs of depression. Cognition:  oriented to person, place, and time   Concentration intact  Memory intact  Insight good   Judgement fair   Fund of Knowledge adequate      ASSESSMENT:  Patient symptoms are:  [x] Well controlled  [x] Improving  [] Worsening  [] No change    Reason for more than one antipsychotic:  [x] N/A  [] 3 Failed Monotherapy attempts (Drugs tried:)  [] Crossover to a new antipsychotic  [] Taper to Monotherapy from Polypharmacy  [] Augmentation of clozapine therapy due to treatment resistance to single therapy    Diagnosis:  Principal Problem:    Severe episode of recurrent major depressive disorder, without psychotic features (HCC)  Active Problems:    Panic disorder without agoraphobia  Resolved Problems:    * No resolved hospital problems. *      LABS:    No results for input(s): WBC, HGB, PLT in the last 72 hours. No results for input(s): NA, K, CL, CO2, BUN, CREATININE, GLUCOSE in the last 72 hours. No results for input(s): BILITOT, ALKPHOS, AST, ALT in the last 72 hours. Lab Results   Component Value Date    LABAMPH NOT DETECTED 04/26/2021    BARBSCNU NOT DETECTED 04/26/2021    LABBENZ NOT DETECTED 04/26/2021    LABMETH NOT DETECTED 04/26/2021    OPIATESCREENURINE NOT DETECTED 04/26/2021    PHENCYCLIDINESCREENURINE NOT DETECTED 04/26/2021    ETOH <10 04/26/2021     Lab Results   Component Value Date    TSH 1.130 04/26/2021     No results found for: LITHIUM  No results found for: VALPROATE, CBMZ    RISK ASSESSMENT AT DISCHARGE: Low risk for suicide and homicide.      Treatment Plan:  The patient's diagnosis, treatment plan, medication management were

## 2021-05-03 NOTE — PLAN OF CARE
Patient A+Ox 4, denies SI, HI, and hallucinations. Denies both anxiety and depression. Sad affect, and flat. Evasive. Sociable with select peers. Patient states that he is feeling \"better. \" No behaviors observed.    Problem: Altered Mood, Depressive Behavior:  Goal: Able to verbalize acceptance of life and situations over which he or she has no control  Description: Able to verbalize acceptance of life and situations over which he or she has no control  5/2/2021 2249 by Lois Dunne RN  Outcome: Ongoing  5/2/2021 1343 by Pham Menjivar RN  Outcome: Ongoing     Problem: Altered Mood, Depressive Behavior:  Goal: Able to verbalize and/or display a decrease in depressive symptoms  Description: Able to verbalize and/or display a decrease in depressive symptoms  5/2/2021 2249 by Lois Dunne RN  Outcome: Ongoing  5/2/2021 1343 by Pham Menjivar RN  Outcome: Ongoing

## 2021-05-04 ENCOUNTER — CARE COORDINATION (OUTPATIENT)
Dept: OTHER | Facility: CLINIC | Age: 31
End: 2021-05-04

## 2021-05-04 NOTE — CARE COORDINATION
[facility] Trans[facility] Initial Follow Up Call    Call within 2 business days of discharge: Yes    Patient: Saida Torres Patient : 1990   MRN: [id number]  Reason for Admission: Anxiety  Discharge Date: 5/3/21 RARS: Readmission Risk Score: 12      Last Discharge [facility]       Complaint Diagnosis Description Type Department Provider    21 Anxiety Anxiety ED to Hosp-Admission (Discharged) (ADMITTED) [facility] Alexis Buchanan MD; Manoj Alvarez. .. Challenges to be reviewed by the provider   Additional needs identified to be addressed with provider No  none     Method of communication with provider : phone- for appointment    Advance Care Planning:   Does patient have an Advance Directive:  reviewed and current. Was this a readmission? No  Patient stated reason for admission: n/a  Patients top risk factors for readmission: depression, ineffective coping and medical condition-anxiety, depression    Care Transition Nurse (CTN) contacted the patient by telephone to perform post hospital discharge assessment. Verified name and  with patient as identifiers. Provided introduction to self, and explanation of the CTN role. CTN reviewed discharge instructions, medical action plan and red flags with patient who verbalized understanding. Patient given an opportunity to ask questions and does not have any further questions or concerns at this time. Were discharge instructions available to patient? Yes. Reviewed appropriate site of care based on symptoms and resources available to patient including: PCP, Specialist, Benefits related nurse triage line, Alen Santiago, When to call 911, [employer] [facility], Condition related references and [facility], Sarita (if patient feels up for group therapy). The patient agrees to contact the PCP office for questions related to their healthcare.      Medication reconciliation was performed with patient, who verbalizes understanding of administration of home medications. Advised obtaining a 90-day supply of all daily and as-needed medications. Discussed follow-up appointments. If no appointment was previously scheduled, appointment scheduling offered: Yes. Is follow up appointment scheduled within 7 days of discharge? Yes- tomorrow at 0830  Non-[facility] follow up appointment(s): n/a    Patient stated he is home now, trying to get organized and get his calls made for counseling. He was given information for [facility] for counseling but patient would prefer to use [facility] Office: [phone]    He states he has been working with [facility] for his intake information. He has a virtual appt with Jacki Barbosa, a counselor, today at 1:30 PM. He states he is hopeful of this group and he believes a lot of positivity will come from from counseling. [facility] scheduled PCP appt for tomorrow at 0830 to discuss admission and new medications. Patient was able to  his 3 medications with no issues. He began taking them yesterday with no AE/SE thus far. He states he resides with his parents and sisters who are fully supporting him. Encouraged patient to have open communication with family per his comfort levels. Patient does not any SI or HI, he does have the numbers for crisis hotline and suicide hotline. 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 He has already completed his STD through employer and [address] [place] [address]. He plans to talk with his manager this week to keep her updated. Patient stated he would prefer for now to stick to one-on-one therapy. [facility] provided information for [facility] for future use. Patient amenable to [facility] follow up and assistance. Plan for follow-up call in 3-5 days based on severity of symptoms and risk factors.   Plan for next call: symptom management-panic, attacks, breathing exercises, self management-incorporating positive thoughts, follow up appointment-pcp, counseling and

## 2021-05-07 ENCOUNTER — CARE COORDINATION (OUTPATIENT)
Dept: OTHER | Facility: CLINIC | Age: 31
End: 2021-05-07

## 2021-05-07 NOTE — CARE COORDINATION
Ambulatory Care Coordination Note  2021  CM Risk Score: 4  Charlson 10 Year Mortality Risk Score: 2%     ACC: Olga Castro LPN    Ambulatory Care Manager (ACM) contacted the patient by telephone to follow up on progress, discuss new issues or concerns, and reinforce/ provide patient education. Verified name and  with patient as identifiers. Summary Note:     Patient states he has been doing well so far managing his anxiety and depression. Continued support from family. Saw PCP , PCP correlated psychiatric appt with a NP through [facility] for next week  to begin medication management. He will begin seeing his psych NP bi-weekly for medication management and panic attack, anxiety/depression management. He had a virtual appt with the counselor from [facility], Papi Camacho, on . Patient planning to meet with Papi Camacho on a weekly basis in conjunction to seeing the NP. ACM reviewed crisis hotline numbers. Patient stated understanding. He will out reach me for any needs he may need going forward. Reinforced/ Provided Education:  Discussed red flags and appropriate site of care based on symptoms and resources available to patient including: PCP, Specialist, Benefits related nurse triage line, Alen Santiago, When to call 911, Condition related references and crisis intervention hotline, SI hotline. Importance and benefits of: Follow up with PCP and specialist, medication adherence, self monitoring and reporting of symptoms. Plan:  Continue bi-weekly outreaches to provide telephonic support, education and resources as needed. Discuss / follow up on: Goal progress and symptom management     Pt verbalized understanding and is agreeable to follow up contact. Britany Berry, [address] Coordinator  Associate Care Management  [address] [place] [address]  Phone: [phone]  [email or web address]

## 2021-05-14 ENCOUNTER — CARE COORDINATION (OUTPATIENT)
Dept: OTHER | Facility: CLINIC | Age: 31
End: 2021-05-14

## 2021-05-14 NOTE — CARE COORDINATION
Associate Care Manager (ACM) sent [employer] message for Associate Care Management follow up related to acm lev. See patient message for details and response (as appropriate). Britany Gonzalez, [address] Coordinator  Associate Care Management  [address] [place] [address], [address] [place] [address]  Phone: [phone]  [email or web address]

## 2021-05-28 ENCOUNTER — CARE COORDINATION (OUTPATIENT)
Dept: OTHER | Facility: CLINIC | Age: 31
End: 2021-05-28

## 2021-05-28 NOTE — CARE COORDINATION
ACC: Zaire Mini, LPN  CM Risk Score: 4  Charlson 10 Year Mortality Risk Score: 2%     ACM attempted to reach patient for follow up call regarding [facility] update. HIPAA compliant message left requesting a return phone call at patients convenience. Will continue to follow. Care Coordination Interventions    Program Enrollment: Complex Care  Referral from Primary Care Provider: No  Suggested Interventions and Community Resources  Other Therapy Services: In Process (Comment: [facility])  Zone Management Tools: In Process (Comment: [facility] Zone Management)  Other Services or Interventions: Provided Suicide Hotline and Crisis Intervention phone numbers         Karyn Ruffin, [address] Coordinator  Associate Care Management  [address] [place] [address]  Phone: [phone]  [email or web address] Writer chaperoned and remained present during the entire injection procedure. Patient tolerated procedure well without incident.

## 2021-06-10 ENCOUNTER — CARE COORDINATION (OUTPATIENT)
Dept: OTHER | Facility: CLINIC | Age: 31
End: 2021-06-10

## 2021-06-10 NOTE — CARE COORDINATION
Ambulatory Care Manager [facility]) contacted the patient by telephone to follow up on progress, discuss new issues or concerns, and reinforce/ provide patient education. Verified name and  with patient as identifiers. Patient voiced he has been doing well. Seeing counselor and NP at [facility]. Patient is no longer needing ACM care coordination. No further outreach scheduled with this ACM, ACM will sign off care team at this time. Episode of Care resolved. Patient has this ACM's contact information if future needs arise. Reinforced/ Provided Education:  Discussed red flags and appropriate site of care based on symptoms and resources available to patient including: PCP, Specialist, Benefits related nurse triage line and crisis hotline #. Importance and benefits of: Follow up with PCP and specialist, medication adherence, self monitoring and reporting of symptoms. Britany Ruffin, [address] Coordinator  Associate Care Management  [address] [place] [address]  Phone: [phone]  [email or web address]

## 2023-07-01 ENCOUNTER — OFFICE VISIT (OUTPATIENT)
Dept: FAMILY MEDICINE CLINIC | Age: 33
End: 2023-07-01

## 2023-07-01 VITALS
HEIGHT: 68 IN | TEMPERATURE: 97.1 F | WEIGHT: 190 LBS | DIASTOLIC BLOOD PRESSURE: 86 MMHG | SYSTOLIC BLOOD PRESSURE: 124 MMHG | HEART RATE: 83 BPM | BODY MASS INDEX: 28.79 KG/M2 | OXYGEN SATURATION: 98 %

## 2023-07-01 DIAGNOSIS — R30.0 DYSURIA: ICD-10-CM

## 2023-07-01 DIAGNOSIS — R30.0 DYSURIA: Primary | ICD-10-CM

## 2023-07-01 DIAGNOSIS — N34.2 URETHRITIS: ICD-10-CM

## 2023-07-01 LAB
BILIRUBIN, POC: NORMAL
BLOOD URINE, POC: NORMAL
CLARITY, POC: CLEAR
COLOR, POC: NORMAL
GLUCOSE URINE, POC: NORMAL
KETONES, POC: NORMAL
LEUKOCYTE EST, POC: NORMAL
NITRITE, POC: NORMAL
PH, POC: 6.5
PROTEIN, POC: NORMAL
SPECIFIC GRAVITY, POC: 1.02
UROBILINOGEN, POC: 0.2

## 2023-07-01 RX ORDER — DOXYCYCLINE 100 MG/1
100 CAPSULE ORAL 2 TIMES DAILY
Qty: 14 CAPSULE | Refills: 0 | Status: SHIPPED | OUTPATIENT
Start: 2023-07-01 | End: 2023-07-08

## 2023-07-01 RX ORDER — DOXYCYCLINE HYCLATE 100 MG/1
100 CAPSULE ORAL 2 TIMES DAILY
Qty: 14 CAPSULE | Refills: 0 | Status: SHIPPED
Start: 2023-07-01 | End: 2023-07-01

## 2023-07-01 RX ORDER — DOXYCYCLINE HYCLATE 100 MG
100 TABLET ORAL 2 TIMES DAILY
Qty: 14 TABLET | Refills: 0 | Status: SHIPPED
Start: 2023-07-01 | End: 2023-07-01

## 2023-07-04 LAB — BACTERIA UR CULT: NORMAL

## 2023-07-07 LAB
CHLAMYDIA DNA UR QL NAA+PROBE: NEGATIVE
N GONORRHOEA DNA UR QL NAA+PROBE: NEGATIVE
SPECIMEN SOURCE: NORMAL

## 2024-08-16 ENCOUNTER — OFFICE VISIT (OUTPATIENT)
Dept: FAMILY MEDICINE CLINIC | Age: 34
End: 2024-08-16

## 2024-08-16 VITALS
WEIGHT: 186.4 LBS | SYSTOLIC BLOOD PRESSURE: 122 MMHG | TEMPERATURE: 98.1 F | HEART RATE: 68 BPM | DIASTOLIC BLOOD PRESSURE: 78 MMHG | OXYGEN SATURATION: 98 % | BODY MASS INDEX: 28.25 KG/M2 | HEIGHT: 68 IN

## 2024-08-16 DIAGNOSIS — R05.9 COUGH, UNSPECIFIED TYPE: ICD-10-CM

## 2024-08-16 DIAGNOSIS — J04.0 ACUTE LARYNGITIS: ICD-10-CM

## 2024-08-16 DIAGNOSIS — J01.90 ACUTE NON-RECURRENT SINUSITIS, UNSPECIFIED LOCATION: ICD-10-CM

## 2024-08-16 DIAGNOSIS — J20.9 ACUTE BRONCHITIS, UNSPECIFIED ORGANISM: Primary | ICD-10-CM

## 2024-08-16 LAB
INFLUENZA A ANTIBODY: NEGATIVE
INFLUENZA B ANTIBODY: NEGATIVE
Lab: NORMAL
PERFORMING INSTRUMENT: NORMAL
QC PASS/FAIL: NORMAL
RSV RNA: NEGATIVE
SARS-COV-2, POC: NORMAL

## 2024-08-16 RX ORDER — ALBUTEROL SULFATE 90 UG/1
2 AEROSOL, METERED RESPIRATORY (INHALATION) 4 TIMES DAILY PRN
Qty: 18 G | Refills: 0 | Status: SHIPPED | OUTPATIENT
Start: 2024-08-16

## 2024-08-16 RX ORDER — BENZONATATE 100 MG/1
100 CAPSULE ORAL 3 TIMES DAILY PRN
Qty: 21 CAPSULE | Refills: 0 | Status: SHIPPED | OUTPATIENT
Start: 2024-08-16 | End: 2024-08-23

## 2024-08-16 RX ORDER — PREDNISONE 10 MG/1
TABLET ORAL
Qty: 18 TABLET | Refills: 0 | Status: SHIPPED | OUTPATIENT
Start: 2024-08-16

## 2024-08-16 RX ORDER — DOXYCYCLINE HYCLATE 100 MG
100 TABLET ORAL 2 TIMES DAILY
Qty: 20 TABLET | Refills: 0 | Status: SHIPPED | OUTPATIENT
Start: 2024-08-16 | End: 2024-08-26

## 2024-08-16 NOTE — PROGRESS NOTES
rhonchi noted throughout, slight expiratory wheeze, no crackles. No stridor or retractions are noted.  Neurological: A&O x4, normal speech  Psychiatric: Cooperative       Testing:     No results found for this visit on 08/16/24.        Medical Decision Making:     Vital signs reviewed    Past medical history reviewed.    Allergies reviewed.    Medications reviewed.    Patient on arrival does not appear to be in any apparent distress or discomfort.  The patient has been seen and evaluated.  The patient does not appear to be toxic or lethargic.     COVID-negative    RSV negative    Influenza negative    Will treat the patient with doxycycline, prednisone, Tessalon Perles and albuterol inhaler.    The patient was educated on the proper dosage of motrin and tylenol and the appropriate intervals of each. The patient is to increase fluid intake over the next several days. The patient is to use OTC decongestant as needed.     The patient is to return to express care or go directly to the emergency department should any of the signs or symptoms worsen. The patient is to followup with primary care physician in 2-3 days for repeat evaluation. The patient has no other questions or concerns at this time the patient will be discharged home.      Clinical Impression:   Marco Antonio was seen today for fever, cough, chills and sinusitis.    Diagnoses and all orders for this visit:    Acute bronchitis, unspecified organism    Cough, unspecified type  -     POCT COVID-19, Antigen  -     POCT Influenza A/B  -     POCT RSV Molecular    Acute non-recurrent sinusitis, unspecified location    Acute laryngitis    Other orders  -     predniSONE (DELTASONE) 10 MG tablet; 3 tabs once daily for 3 days, 2 tabs once daily for 3 days, 1 tab once daily for 3 days  -     doxycycline hyclate (VIBRA-TABS) 100 MG tablet; Take 1 tablet by mouth 2 times daily for 10 days  -     benzonatate (TESSALON) 100 MG capsule; Take 1 capsule by mouth 3 times daily as

## (undated) DEVICE — INSUFFLATION NEEDLE TO ESTABLISH PNEUMOPERITONEUM.: Brand: INSUFFLATION NEEDLE

## (undated) DEVICE — TOTAL TRAY, DB, 100% SILI FOLEY, 16FR 10: Brand: MEDLINE

## (undated) DEVICE — SURGICAL PROCEDURE PACK BASIC

## (undated) DEVICE — TROCAR: Brand: KII® SLEEVE

## (undated) DEVICE — INSUFFLATION TUBING SET WITH FILTER, FUNNEL CONNECTOR AND LUER LOCK: Brand: JOSNOE MEDICAL INC

## (undated) DEVICE — INTENDED FOR TISSUE SEPARATION, AND OTHER PROCEDURES THAT REQUIRE A SHARP SURGICAL BLADE TO PUNCTURE OR CUT.: Brand: BARD-PARKER ® STAINLESS STEEL BLADES

## (undated) DEVICE — PATIENT RETURN ELECTRODE, SINGLE-USE, CONTACT QUALITY MONITORING, ADULT, WITH 9FT CORD, FOR PATIENTS WEIGING OVER 33LBS. (15KG): Brand: MEGADYNE

## (undated) DEVICE — KIT,ANTI FOG,W/SPONGE & FLUID,SOFT PACK: Brand: MEDLINE

## (undated) DEVICE — SET INSTRUMENT LAP I

## (undated) DEVICE — SYRINGE 20ML LL S/C 50

## (undated) DEVICE — TOWEL,OR,DSP,ST,BLUE,STD,6/PK,12PK/CS: Brand: MEDLINE

## (undated) DEVICE — RELOAD STPL SZ 0 L45MM DIA3.5MM 0DEG STD REG TISS BLU TI

## (undated) DEVICE — CUTTER ENDOSCP L340MM LIN ARTC SGL STROKE FIRING ENDOPATH

## (undated) DEVICE — BLADE CLIPPER GEN PURP NS

## (undated) DEVICE — GOWN,SIRUS,FABRNF,XL,20/CS: Brand: MEDLINE

## (undated) DEVICE — SEALER ENDOSCP L37CM NANO COAT BLNT TIP LAP DIV

## (undated) DEVICE — TIBURON GENERAL ENDOSCOPY DRAPE: Brand: CONVERTORS

## (undated) DEVICE — ELECTRODE PT RET AD L9FT HI MOIST COND ADH HYDRGEL CORDED

## (undated) DEVICE — TROCAR: Brand: KII FIOS FIRST ENTRY

## (undated) DEVICE — GLOVE SURG SZ 75 STD WHT LTX SYN POLYMER BEAD REINF ANTI RL

## (undated) DEVICE — NEEDLE SYR 18GA L1.5IN RED PLAS HUB S STL BLNT FILL W/O

## (undated) DEVICE — GARMENT,MEDLINE,DVT,INT,CALF,MED, GEN2: Brand: MEDLINE

## (undated) DEVICE — SCISSOR REPROCESS SURG ENDOSHEAR  5MM

## (undated) DEVICE — Z DUP USE 2257490 ADHESIVE SKIN CLSRE 036ML TPCL 2CTL CNCRLTE HIGH VSCSTY DRMB

## (undated) DEVICE — CAMERA STRYKER 1488 HD GEN

## (undated) DEVICE — APPLICATOR MEDICATED 26 CC SOLUTION HI LT ORNG CHLORAPREP

## (undated) DEVICE — SET ENDO INSTR LAPAROSCOPIC STGENLAP